# Patient Record
Sex: FEMALE | Race: WHITE | NOT HISPANIC OR LATINO
[De-identification: names, ages, dates, MRNs, and addresses within clinical notes are randomized per-mention and may not be internally consistent; named-entity substitution may affect disease eponyms.]

---

## 2018-08-31 ENCOUNTER — RX RENEWAL (OUTPATIENT)
Age: 43
End: 2018-08-31

## 2018-09-04 ENCOUNTER — RX CHANGE (OUTPATIENT)
Age: 43
End: 2018-09-04

## 2018-09-04 RX ORDER — VALSARTAN AND HYDROCHLOROTHIAZIDE 160; 25 MG/1; MG/1
160-25 TABLET, FILM COATED ORAL DAILY
Qty: 90 | Refills: 3 | Status: DISCONTINUED | COMMUNITY
Start: 2018-08-31 | End: 2018-09-04

## 2018-09-21 ENCOUNTER — RX RENEWAL (OUTPATIENT)
Age: 43
End: 2018-09-21

## 2018-10-29 ENCOUNTER — RX RENEWAL (OUTPATIENT)
Age: 43
End: 2018-10-29

## 2018-12-03 ENCOUNTER — RECORD ABSTRACTING (OUTPATIENT)
Age: 43
End: 2018-12-03

## 2018-12-03 DIAGNOSIS — Z87.891 PERSONAL HISTORY OF NICOTINE DEPENDENCE: ICD-10-CM

## 2018-12-03 DIAGNOSIS — Z83.3 FAMILY HISTORY OF DIABETES MELLITUS: ICD-10-CM

## 2018-12-05 ENCOUNTER — APPOINTMENT (OUTPATIENT)
Dept: ENDOCRINOLOGY | Facility: CLINIC | Age: 43
End: 2018-12-05

## 2019-03-29 ENCOUNTER — MEDICATION RENEWAL (OUTPATIENT)
Age: 44
End: 2019-03-29

## 2019-04-03 ENCOUNTER — MEDICATION RENEWAL (OUTPATIENT)
Age: 44
End: 2019-04-03

## 2019-04-16 ENCOUNTER — RX RENEWAL (OUTPATIENT)
Age: 44
End: 2019-04-16

## 2019-05-17 ENCOUNTER — APPOINTMENT (OUTPATIENT)
Dept: ENDOCRINOLOGY | Facility: CLINIC | Age: 44
End: 2019-05-17
Payer: COMMERCIAL

## 2019-05-17 ENCOUNTER — RESULT CHARGE (OUTPATIENT)
Age: 44
End: 2019-05-17

## 2019-05-17 VITALS
HEART RATE: 106 BPM | WEIGHT: 157 LBS | OXYGEN SATURATION: 98 % | HEIGHT: 64 IN | BODY MASS INDEX: 26.8 KG/M2 | DIASTOLIC BLOOD PRESSURE: 70 MMHG | SYSTOLIC BLOOD PRESSURE: 110 MMHG

## 2019-05-17 LAB
GLUCOSE BLDC GLUCOMTR-MCNC: 131
HBA1C MFR BLD HPLC: 10.6
LDLC SERPL CALC-MCNC: 89
MICROALBUMIN/CREAT 24H UR-RTO: 6

## 2019-05-17 PROCEDURE — 99214 OFFICE O/P EST MOD 30 MIN: CPT | Mod: 25

## 2019-05-17 PROCEDURE — 82962 GLUCOSE BLOOD TEST: CPT

## 2019-05-17 RX ORDER — INSULIN GLARGINE 100 [IU]/ML
100 INJECTION, SOLUTION SUBCUTANEOUS
Refills: 0 | Status: DISCONTINUED | COMMUNITY
End: 2019-05-17

## 2019-05-17 RX ORDER — HYDROCHLOROTHIAZIDE 25 MG/1
25 TABLET ORAL
Refills: 0 | Status: DISCONTINUED | COMMUNITY
End: 2019-05-17

## 2019-05-17 RX ORDER — VALSARTAN AND HYDROCHLOROTHIAZIDE 160; 25 MG/1; MG/1
160-25 TABLET, FILM COATED ORAL DAILY
Qty: 90 | Refills: 1 | Status: DISCONTINUED | COMMUNITY
End: 2019-05-17

## 2019-05-17 RX ORDER — LOSARTAN POTASSIUM 50 MG/1
50 TABLET, FILM COATED ORAL
Refills: 0 | Status: DISCONTINUED | COMMUNITY
End: 2019-05-17

## 2019-05-17 NOTE — REVIEW OF SYSTEMS
[Recent Weight Gain (___ Lbs)] : no recent weight gain [Recent Weight Loss (___ Lbs)] : no recent weight loss [Dysphagia] : no dysphagia [Dysphonia] : no dysphonia [Neck Pain] : no neck pain [Chest Pain] : no chest pain [Palpitations] : no palpitations [Shortness Of Breath] : no shortness of breath [Nausea] : no nausea [Vomiting] : no vomiting was observed [Constipation] : no constipation [Diarrhea] : no diarrhea [Polyuria] : no polyuria [Abdominal Pain] : no abdominal pain [Hair Loss] : no hair loss [Dry Skin] : no dry skin [Tremors] : no tremors [Depression] : no depression [Pain/Numbness of Digits] : no pain/numbness of digits [Anxiety] : no anxiety [Polydipsia] : no polydipsia [Cold Intolerance] : cold tolerant [Heat Intolerance] : heat tolerant

## 2019-05-17 NOTE — ASSESSMENT
[FreeTextEntry1] : 44 year old female with uncontrolled T1DM, hypothyroidism, hypertension, hyperlipidemia, and vitamin D deficiency. Glycemic control is poor with A1c 10.6%.\par \par 1. T1DM- uncontrolled.\par -Increase Tresiba to 45 units daily. If BG still high after several days, ok to increase to 47 units daily.\par -Continue ICR 1:10 at breakfast and 1:15 at dinner. Tighten ICR at lunch to 1:12 to help with afternoon hyperglycemia.\par -Would benefit from CGM given glucose variability. Glucose sensor necessity: This patient with diabetes performs four or more glucose checks per day utilizing a home blood glucose monitor. The patient is treated with insulin via three or more injections daily. This patient requires frequent adjustments to their insulin treatment on the basis of therapeutic continuous glucose monitoring results. In addition, the patient has been to our office for an evaluation of their diabetes control within the past six months. \par -CMN for Dexcom completed.\par -Discussed restarting CSII. Patient will consider. Given information for T-slim.\par -Repeat A1c in 3 months.\par \par 2. Hypothyroid- euthyroid on replacement T4.\par -Continue current dose of LT4.\par -Repat TSH in 3 months.\par \par 3. Hyperlipidemia- LDL-C acceptable.\par -Continue statin.\par -Repeat lipids in 3 months.\par \par 4. Hypertension- controlled\par -Continue ARB.\par \par 5. Vitamin D deficiency- resolved.\par \par 6. Hypercalcemia- stable.\par -? d/t HCTZ?\par -Has been around 10.3. \par -Followed by oncologist. Will continue to monitor.

## 2019-05-17 NOTE — PHYSICAL EXAM
[Alert] : alert [No Acute Distress] : no acute distress [Well Nourished] : well nourished [Well Developed] : well developed [Normal Sclera/Conjunctiva] : normal sclera/conjunctiva [EOMI] : extra ocular movement intact [No Proptosis] : no proptosis [Normal Oropharynx] : the oropharynx was normal [No Respiratory Distress] : no respiratory distress [Thyroid Not Enlarged] : the thyroid was not enlarged [No Thyroid Nodules] : there were no palpable thyroid nodules [No Accessory Muscle Use] : no accessory muscle use [Clear to Auscultation] : lungs were clear to auscultation bilaterally [Normal Rate] : heart rate was normal  [Normal S1, S2] : normal S1 and S2 [Regular Rhythm] : with a regular rhythm [No Edema] : there was no peripheral edema [Normal Strength/Tone] : muscle strength and tone were normal [Normal Gait] : normal gait [No Tremors] : no tremors [Normal Affect] : the affect was normal [Oriented x3] : oriented to person, place, and time [Normal Mood] : the mood was normal [Acanthosis Nigricans] : no acanthosis nigricans [de-identified] : multiple tattoos

## 2019-05-17 NOTE — HISTORY OF PRESENT ILLNESS
[FreeTextEntry1] : Type: 1\par Severity: moderate\par Duration: diagnosed at age 24\par Onset: GYN found polyuria\par Associated symptoms: hyperglycemia\par \par Current meds for glycemic control:\par Tresiba 43 units daily- self-increased from 41 units daily just a few days ago.\par Novolog 1:10 at breakfast, 1:15 at lunch and dinner. ISF 1:40, target 120\par SMBG 5-6x daily. Reports BG is usually 130s to 150s, but is very high between 1pm and 6pm, up to 350s. Rare hypoglycemia, usually only after working out. Reviewed meter. BG ranges 126 to 454, with most readings in the mid-100s to mid-200s range.\par Was on CSII in the past- off right now because "I just needed a break."\par Current B, has not eaten yet today\par \par Last eye exam: over one year, no changes in vision\par Last foot exam: in office, denies pain/numbness/tingling in B/L feet\par Diet: carb counting\par Weight: stable\par Exercise: weight training 3-4x per week\par \par Also with hypothyroidism, on LT4 50 mcg daily. Takes on an empty stomach at least one hour before eating with other medications.

## 2019-07-01 ENCOUNTER — MEDICATION RENEWAL (OUTPATIENT)
Age: 44
End: 2019-07-01

## 2019-08-16 ENCOUNTER — APPOINTMENT (OUTPATIENT)
Dept: ENDOCRINOLOGY | Facility: CLINIC | Age: 44
End: 2019-08-16
Payer: COMMERCIAL

## 2019-08-16 PROCEDURE — 36415 COLL VENOUS BLD VENIPUNCTURE: CPT

## 2019-08-27 LAB
25(OH)D3 SERPL-MCNC: 38.5 NG/ML
ALBUMIN SERPL ELPH-MCNC: 4.6 G/DL
ALP BLD-CCNC: 69 U/L
ALT SERPL-CCNC: 28 U/L
ANION GAP SERPL CALC-SCNC: 11 MMOL/L
AST SERPL-CCNC: 23 U/L
BASOPHILS # BLD AUTO: 0.07 K/UL
BASOPHILS NFR BLD AUTO: 0.8 %
BILIRUB SERPL-MCNC: 0.3 MG/DL
BUN SERPL-MCNC: 16 MG/DL
CALCIUM SERPL-MCNC: 10 MG/DL
CHLORIDE SERPL-SCNC: 99 MMOL/L
CHOLEST SERPL-MCNC: 164 MG/DL
CHOLEST/HDLC SERPL: 2.7 RATIO
CO2 SERPL-SCNC: 31 MMOL/L
CREAT SERPL-MCNC: 0.94 MG/DL
CREAT SPEC-SCNC: 136 MG/DL
EOSINOPHIL # BLD AUTO: 0.13 K/UL
EOSINOPHIL NFR BLD AUTO: 1.6 %
ESTIMATED AVERAGE GLUCOSE: 192 MG/DL
GLUCOSE SERPL-MCNC: 170 MG/DL
HBA1C MFR BLD HPLC: 8.3 %
HCT VFR BLD CALC: 45.5 %
HDLC SERPL-MCNC: 61 MG/DL
HGB BLD-MCNC: 14.3 G/DL
IMM GRANULOCYTES NFR BLD AUTO: 0.1 %
LDLC SERPL CALC-MCNC: 89 MG/DL
LYMPHOCYTES # BLD AUTO: 2.44 K/UL
LYMPHOCYTES NFR BLD AUTO: 29.6 %
MAN DIFF?: NORMAL
MCHC RBC-ENTMCNC: 29.1 PG
MCHC RBC-ENTMCNC: 31.4 GM/DL
MCV RBC AUTO: 92.5 FL
MICROALBUMIN 24H UR DL<=1MG/L-MCNC: <1.2 MG/DL
MICROALBUMIN/CREAT 24H UR-RTO: NORMAL MG/G
MONOCYTES # BLD AUTO: 0.62 K/UL
MONOCYTES NFR BLD AUTO: 7.5 %
NEUTROPHILS # BLD AUTO: 4.98 K/UL
NEUTROPHILS NFR BLD AUTO: 60.4 %
PLATELET # BLD AUTO: 364 K/UL
POTASSIUM SERPL-SCNC: 4.9 MMOL/L
PROT SERPL-MCNC: 7.1 G/DL
RBC # BLD: 4.92 M/UL
RBC # FLD: 13.3 %
SODIUM SERPL-SCNC: 141 MMOL/L
TRIGL SERPL-MCNC: 68 MG/DL
TSH SERPL-ACNC: 1.6 UIU/ML
WBC # FLD AUTO: 8.25 K/UL

## 2019-08-30 ENCOUNTER — APPOINTMENT (OUTPATIENT)
Dept: ENDOCRINOLOGY | Facility: CLINIC | Age: 44
End: 2019-08-30
Payer: COMMERCIAL

## 2019-08-30 VITALS
HEART RATE: 9 BPM | HEIGHT: 64 IN | SYSTOLIC BLOOD PRESSURE: 140 MMHG | BODY MASS INDEX: 27.31 KG/M2 | DIASTOLIC BLOOD PRESSURE: 80 MMHG | WEIGHT: 160 LBS

## 2019-08-30 PROCEDURE — 99214 OFFICE O/P EST MOD 30 MIN: CPT | Mod: 25

## 2019-08-30 PROCEDURE — 82962 GLUCOSE BLOOD TEST: CPT | Mod: NC

## 2019-08-30 PROCEDURE — 95251 CONT GLUC MNTR ANALYSIS I&R: CPT

## 2019-08-30 NOTE — REVIEW OF SYSTEMS
[Recent Weight Gain (___ Lbs)] : no recent weight gain [Recent Weight Loss (___ Lbs)] : no recent weight loss [Dysphagia] : no dysphagia [Blurry Vision] : no blurred vision [Neck Pain] : no neck pain [Dysphonia] : no dysphonia [Chest Pain] : no chest pain [Palpitations] : no palpitations [Lower Ext Edema] : no lower extremity edema [Shortness Of Breath] : no shortness of breath [Vomiting] : no vomiting was observed [Nausea] : no nausea [Diarrhea] : no diarrhea [Constipation] : no constipation [Abdominal Pain] : no abdominal pain [Polyuria] : no polyuria [Pain/Numbness of Digits] : no pain/numbness of digits [Tremors] : no tremors [Depression] : no depression [Anxiety] : no anxiety [Polydipsia] : no polydipsia [Cold Intolerance] : cold tolerant [Heat Intolerance] : heat tolerant [Swelling] : no swelling

## 2019-08-30 NOTE — PHYSICAL EXAM
[Alert] : alert [No Acute Distress] : no acute distress [Well Nourished] : well nourished [Well Developed] : well developed [Normal Sclera/Conjunctiva] : normal sclera/conjunctiva [EOMI] : extra ocular movement intact [No Proptosis] : no proptosis [Normal Oropharynx] : the oropharynx was normal [Thyroid Not Enlarged] : the thyroid was not enlarged [No Thyroid Nodules] : there were no palpable thyroid nodules [No Respiratory Distress] : no respiratory distress [No Accessory Muscle Use] : no accessory muscle use [Clear to Auscultation] : lungs were clear to auscultation bilaterally [Normal Rate] : heart rate was normal  [Regular Rhythm] : with a regular rhythm [Normal S1, S2] : normal S1 and S2 [No Edema] : there was no peripheral edema [Normal Gait] : normal gait [Normal Strength/Tone] : muscle strength and tone were normal [No Tremors] : no tremors [Oriented x3] : oriented to person, place, and time [Normal Affect] : the affect was normal [Normal Mood] : the mood was normal [Acanthosis Nigricans] : no acanthosis nigricans [de-identified] : multiple tattoos

## 2019-08-30 NOTE — HISTORY OF PRESENT ILLNESS
[FreeTextEntry1] : History of breast cancer, planning on reconstructive surgery in 2019.\par \par Type: 1\par Severity: moderate\par Duration: diagnosed at age 24\par Onset: GYN found polyuria\par Associated symptoms: hyperglycemia\par \par Current meds for glycemic control:\par Tresiba 45 units daily\par Novolog 1:10 at breakfast, 1:12 at lunch, 1:15 dinner. ISF 1:40, target 120\par SMBG with Decom. Changes sensor every 10 days. Reviewed CGM download. Avg  +/- 69 with 64% of values in range, 12% high, and 4% low. Occasional hypoglycemia at night. BG starts to rise daily at 11am. Patient reports breakfast is at 930 every day, and BG rises at 11am even when she skips breakfast.\par Current B, given apple juice. Asymptomatic.BG improved to 70s.\par \par Last eye exam: over one year, no changes in vision\par Last foot exam: in office, denies pain/numbness/tingling in B/L feet\par Diet: carb counting\par Weight: stable\par Exercise: weight training 3-4x per week\par \par Also with hypothyroidism, on LT4 50 mcg daily. Takes on an empty stomach at least one hour before eating with other medications.

## 2019-08-30 NOTE — ASSESSMENT
[FreeTextEntry1] : 44 year old female with  T1DM, hypothyroidism, hypertension, hyperlipidemia, and vitamin D deficiency. Glycemic control is improving, A1c now down to 8.3% from 10.6% 3 months ago.\par \par 1. T1DM- improving.\par -Continue Tresiba to 45 units daily. Try taking at night instead of in the morning to see if this helps with 11am rise in BG.\par -Complete basal checks. 11am rise in BG may be due to slow digestion?? Can consider taking breakfast insulin after the meal if this is the case.\par -May also consider small insulin bolus around 11am to prevent BG rise. InPen would be a good choice to help deliver smaller doses of insulin.\par -Continue ICR 1:10 at breakfast 1:12 at lunch, and 1:15 at dinner.\par -Continue Dexcom.\par -Repeat A1c in 3 months.\par -Reviewed BG targets and risk of hyperglycemia in the periopeartive period.\par \par 2. Hypothyroid- euthyroid on replacement T4.\par -Continue current dose of LT4.\par -Repat TSH in 3 months.\par \par 3. Hyperlipidemia- LDL-C acceptable.\par -Continue statin.\par -Repeat lipids in 3 months.\par \par 4. Hypertension- controlled\par -Continue ARB.\par \par 5. Hypercalcemia- now normal at 10.0\par -Followed by oncologist. Will continue to monitor.

## 2019-09-05 ENCOUNTER — MEDICATION RENEWAL (OUTPATIENT)
Age: 44
End: 2019-09-05

## 2019-09-17 ENCOUNTER — RESULT CHARGE (OUTPATIENT)
Age: 44
End: 2019-09-17

## 2019-09-30 ENCOUNTER — LABORATORY RESULT (OUTPATIENT)
Age: 44
End: 2019-09-30

## 2019-09-30 ENCOUNTER — APPOINTMENT (OUTPATIENT)
Dept: ENDOCRINOLOGY | Facility: CLINIC | Age: 44
End: 2019-09-30
Payer: COMMERCIAL

## 2019-09-30 PROCEDURE — 36415 COLL VENOUS BLD VENIPUNCTURE: CPT

## 2019-10-01 ENCOUNTER — OTHER (OUTPATIENT)
Age: 44
End: 2019-10-01

## 2019-11-20 ENCOUNTER — RX RENEWAL (OUTPATIENT)
Age: 44
End: 2019-11-20

## 2019-11-21 ENCOUNTER — RX RENEWAL (OUTPATIENT)
Age: 44
End: 2019-11-21

## 2019-11-22 ENCOUNTER — RX RENEWAL (OUTPATIENT)
Age: 44
End: 2019-11-22

## 2019-11-25 ENCOUNTER — APPOINTMENT (OUTPATIENT)
Dept: ENDOCRINOLOGY | Facility: CLINIC | Age: 44
End: 2019-11-25
Payer: COMMERCIAL

## 2019-11-25 VITALS
HEART RATE: 105 BPM | HEIGHT: 64 IN | WEIGHT: 150 LBS | DIASTOLIC BLOOD PRESSURE: 70 MMHG | BODY MASS INDEX: 25.61 KG/M2 | SYSTOLIC BLOOD PRESSURE: 128 MMHG

## 2019-11-25 LAB — GLUCOSE BLDC GLUCOMTR-MCNC: 85

## 2019-11-25 PROCEDURE — 99214 OFFICE O/P EST MOD 30 MIN: CPT | Mod: 25

## 2019-11-25 PROCEDURE — 95251 CONT GLUC MNTR ANALYSIS I&R: CPT

## 2019-11-25 PROCEDURE — 82962 GLUCOSE BLOOD TEST: CPT | Mod: NC

## 2019-11-25 NOTE — ASSESSMENT
[FreeTextEntry1] : 44 year old female with  T1DM, hypothyroidism, hypertension, hyperlipidemia, and vitamin D deficiency. Glycemic control is improving, A1c  7.6 on dexcom report\par  .\par \par 1. T1DM- improving.\par -Continue Tresiba to 32units  qhs \par -rotate injeciton  sites\par -? gastroparesiss- take lunhctime insulin injection 30 min afrter start of meal - may help with sppikes \par -\par -Continue ICR 1:10 at breakfast 1:12 at lunch, and 1:15 at dinner.\par -Continue Dexcom.\par -Repeat A1c in 3 months.\par \par \par 2. Hypothyroid- euthyroid on replacement T4.\par -Continue current dose of LT4.\par -Repat TSH in 3 months.\par \par 3. Hyperlipidemia- LDL-C acceptable.\par -Continue statin.\par -Repeat lipids in 3 months.\par \par 4. Hypertension- controlled\par -Continue ARB.\par \par 5. Hypercalcemia- now normal at 10.0\par -Followed by oncologist. Will continue to monitor.\par \par 6> GII - ? gastroparesis- will  see gastroenterology for eval  \par  may need to have low residue diet

## 2019-11-25 NOTE — PHYSICAL EXAM
[Alert] : alert [Well Nourished] : well nourished [No Acute Distress] : no acute distress [Well Developed] : well developed [Normal Sclera/Conjunctiva] : normal sclera/conjunctiva [EOMI] : extra ocular movement intact [Thyroid Not Enlarged] : the thyroid was not enlarged [No Thyroid Nodules] : there were no palpable thyroid nodules [No Respiratory Distress] : no respiratory distress [No Accessory Muscle Use] : no accessory muscle use [Clear to Auscultation] : lungs were clear to auscultation bilaterally [Normal Rate] : heart rate was normal  [Normal S1, S2] : normal S1 and S2 [Regular Rhythm] : with a regular rhythm [No Edema] : there was no peripheral edema [Normal Gait] : normal gait [Normal Strength/Tone] : muscle strength and tone were normal [No Tremors] : no tremors [Oriented x3] : oriented to person, place, and time [Normal Affect] : the affect was normal [Normal Mood] : the mood was normal [No Proptosis] : no proptosis [Pedal Pulses Normal] : the pedal pulses are present [Anterior Cervical Nodes] : anterior cervical nodes [Post Cervical Nodes] : posterior cervical nodes [Axillary Nodes] : axillary nodes [No Spinal Tenderness] : no spinal tenderness [Spine Straight] : spine straight [No Stigmata of Cushings Syndrome] : no stigmata of cushings syndrome [No Rash] : no rash [Acanthosis Nigricans] : no acanthosis nigricans [Left Foot Was Examined] : left foot ~C was examined [Right Foot Was Examined] : right foot ~C was examined [Full ROM] : with full range of motion [Normal] : normal [Normal Reflexes] : deep tendon reflexes were 2+ and symmetric [de-identified] : multiple tattoos   area site hypertrophy on right arm

## 2019-11-25 NOTE — HISTORY OF PRESENT ILLNESS
[FreeTextEntry1] : History of breast cancer, planning on reconstructive surgery in 2019.\par \par Type: 1\par Severity: moderate\par Duration: diagnosed at age 24\par Onset: GYN found polyuria\par Associated symptoms: hyperglycemia\par just had surgery 5 weeks ago for abdominopalsty \par Current meds for glycemic control:\par Tresiba 32 units daily weaned down from 45 \par Novolog 1:10 at breakfast, 1:12 at lunch, 1:15 dinner. ISF 1:40, target 120\par SMBG with Decom. Changes sensor every 10 days. Reviewed CGM download. Avg +/- 62 with 58% of values in range, 39 high, and 3 % low. \par \par  BS rise typiclly in afternoon \par  very stressed with work \par  \par Current B  but dexcom read 55 given apple juice. Asymptomatic.BG improved to 107 by FS repeat \par did not take any novolog this AM \par  waiting to get to work to have breakfast \par \par Last eye exam: over one year, no changes in vision  due for check up \par Last foot exam: in office, denies pain/numbness/tingling in B/L feet\par Diet: carb counting\par Weight: stable\par Exercise: weight training 3-4x per week\par \par Also with hypothyroidism, on LT4 50 mcg daily. Takes on an empty stomach at least one hour before eating with other medications.

## 2019-11-25 NOTE — REVIEW OF SYSTEMS
[Recent Weight Gain (___ Lbs)] : no recent weight gain [Recent Weight Loss (___ Lbs)] : no recent weight loss [Blurry Vision] : no blurred vision [Dysphagia] : no dysphagia [Neck Pain] : no neck pain [Dysphonia] : no dysphonia [Chest Pain] : no chest pain [Palpitations] : no palpitations [Lower Ext Edema] : no lower extremity edema [Shortness Of Breath] : no shortness of breath [Nausea] : no nausea [Vomiting] : no vomiting was observed [Constipation] : no constipation [Polyuria] : no polyuria [Abdominal Pain] : no abdominal pain [Diarrhea] : no diarrhea [Tremors] : no tremors [Pain/Numbness of Digits] : no pain/numbness of digits [Depression] : no depression [Anxiety] : no anxiety [Polydipsia] : no polydipsia [Cold Intolerance] : cold tolerant [Heat Intolerance] : heat tolerant [Swelling] : no swelling [Negative] : Respiratory [FreeTextEntry7] : some early satiety  eats really 1 meal per day  appetite is not too much   lunch is main meal then has  smaller dinner

## 2020-02-03 ENCOUNTER — RX RENEWAL (OUTPATIENT)
Age: 45
End: 2020-02-03

## 2020-02-04 ENCOUNTER — RX RENEWAL (OUTPATIENT)
Age: 45
End: 2020-02-04

## 2020-03-06 ENCOUNTER — APPOINTMENT (OUTPATIENT)
Dept: ENDOCRINOLOGY | Facility: CLINIC | Age: 45
End: 2020-03-06

## 2020-06-12 ENCOUNTER — APPOINTMENT (OUTPATIENT)
Dept: ENDOCRINOLOGY | Facility: CLINIC | Age: 45
End: 2020-06-12

## 2020-06-19 ENCOUNTER — APPOINTMENT (OUTPATIENT)
Dept: ENDOCRINOLOGY | Facility: CLINIC | Age: 45
End: 2020-06-19

## 2020-07-21 ENCOUNTER — APPOINTMENT (OUTPATIENT)
Dept: ENDOCRINOLOGY | Facility: CLINIC | Age: 45
End: 2020-07-21
Payer: COMMERCIAL

## 2020-07-21 VITALS
HEIGHT: 64 IN | HEART RATE: 98 BPM | WEIGHT: 155 LBS | DIASTOLIC BLOOD PRESSURE: 76 MMHG | BODY MASS INDEX: 26.46 KG/M2 | SYSTOLIC BLOOD PRESSURE: 112 MMHG

## 2020-07-21 DIAGNOSIS — Z87.898 PERSONAL HISTORY OF OTHER SPECIFIED CONDITIONS: ICD-10-CM

## 2020-07-21 PROCEDURE — 95251 CONT GLUC MNTR ANALYSIS I&R: CPT

## 2020-07-21 PROCEDURE — 99214 OFFICE O/P EST MOD 30 MIN: CPT | Mod: 25

## 2020-07-21 RX ORDER — GABAPENTIN 600 MG/1
600 TABLET, COATED ORAL 3 TIMES DAILY
Qty: 30 | Refills: 0 | Status: DISCONTINUED | COMMUNITY
Start: 2019-11-25 | End: 2020-07-21

## 2020-07-21 NOTE — PHYSICAL EXAM
[Alert] : alert [Well Nourished] : well nourished [No Acute Distress] : no acute distress [Well Developed] : well developed [Normal Sclera/Conjunctiva] : normal sclera/conjunctiva [No Proptosis] : no proptosis [Normal Oropharynx] : the oropharynx was normal [EOMI] : extra ocular movement intact [No Respiratory Distress] : no respiratory distress [No Thyroid Nodules] : no palpable thyroid nodules [No Accessory Muscle Use] : no accessory muscle use [Thyroid Not Enlarged] : the thyroid was not enlarged [Clear to Auscultation] : lungs were clear to auscultation bilaterally [Normal Rate] : heart rate was normal [Normal S1, S2] : normal S1 and S2 [No Edema] : no peripheral edema [Pedal Pulses Normal] : the pedal pulses are present [Regular Rhythm] : with a regular rhythm [Normal Bowel Sounds] : normal bowel sounds [Not Tender] : non-tender [Not Distended] : not distended [Soft] : abdomen soft [Normal Anterior Cervical Nodes] : no anterior cervical lymphadenopathy [Normal Posterior Cervical Nodes] : no posterior cervical lymphadenopathy [Spine Straight] : spine straight [No Stigmata of Cushings Syndrome] : no stigmata of Cushings Syndrome [Normal Gait] : normal gait [No Rash] : no rash [No Tremors] : no tremors [Oriented x3] : oriented to person, place, and time [Acanthosis Nigricans] : no acanthosis nigricans

## 2020-07-21 NOTE — HISTORY OF PRESENT ILLNESS
[FreeTextEntry1] : followup for dm \par breast cancer s/p surgery and chemoT now s/p reconstructions

## 2020-07-21 NOTE — ASSESSMENT
[Retinopathy Screening] : Patient was referred to ophthalmology for retinopathy screening [Importance of Diet and Exercise] : importance of diet and exercise to improve glycemic control, achieve weight loss and improve cardiovascular health [Exercise/Effect on Glucose] : exercise/effect on glucose [FreeTextEntry1] : 44 year old female with T1DM, hypothyroidism, hypertension, hyperlipidemia,\par  and vitamin D deficiency.  LAbs pending. \par \par 1. T1DM\par - cgms reviewed and discussed . She has consistent hyperG over night 250-350 due to night snacks. She sattes she boluses them w 5 units but i suspect she either doesn’t or just  takes random doses of insulin. She is not very accurate with her carb counting. She has hyperG > 250 after each dinner. She changes INC ration for dinner. \par - CDE for carb counting bc this is  her main problems. Advised to have more consistent meal pattern. \par - Continue Tresiba to 33 u HS . \par - continue novolog for meals. continue ICR 1: 5 in am , 1:12 at lunch and dinner 1:12 /1:15 .\par -Continue Dexcom.\par - discussed diet and exercise. Continue low carb meals. \par - encouraged more exercise walking 30 min 3 x week\par - Discussed complications of diabetes at length including MI/CVA/ESRD/dialysis/blindness/amputations/infections\par - Needs to try to have more protein for meals\par Importance of blood glucose monitoring discussed. Targets reviewed. Recommended pt try to keep blood glucose level below 130 (110) before meals and below 160 (140) two hours after meals.\par - she will fax us her labs. \par - check darwin/ ratio. continue ARB / HCTZ \par - check GFR \par - eye exam referral \par - she can continue with weight lifting at home 4 days/ week \par \par 2. Hypothyroid- check tfts \par -Continue current dose of LT4.\par -Repeat TSH in 3 months.\par \par 3. Hyperlipidemia- check lipids. \par -Continue statin.\par -Repeat lipids in 3 months.\par \par 4. Hypertension-  bp at target on meds.\par I advised low fat/low cholesterol diet, low salt diet, and weight loss\par -Continue ARB.\par \par 5. Hypercalcemia- now normal at 10.0\par -Followed by oncologist. Will continue to monitor. \par \par 6 . vitamin d deficiency : continue supplements. MVI \par discussed importance for bone health \par

## 2020-07-24 ENCOUNTER — RX RENEWAL (OUTPATIENT)
Age: 45
End: 2020-07-24

## 2021-02-23 ENCOUNTER — APPOINTMENT (OUTPATIENT)
Dept: ENDOCRINOLOGY | Facility: CLINIC | Age: 46
End: 2021-02-23
Payer: COMMERCIAL

## 2021-02-23 VITALS
TEMPERATURE: 97.5 F | SYSTOLIC BLOOD PRESSURE: 114 MMHG | BODY MASS INDEX: 26.69 KG/M2 | OXYGEN SATURATION: 98 % | HEIGHT: 64 IN | HEART RATE: 90 BPM | WEIGHT: 156.31 LBS | DIASTOLIC BLOOD PRESSURE: 60 MMHG | RESPIRATION RATE: 14 BRPM

## 2021-02-23 PROCEDURE — 99214 OFFICE O/P EST MOD 30 MIN: CPT | Mod: 25

## 2021-02-23 PROCEDURE — 95251 CONT GLUC MNTR ANALYSIS I&R: CPT

## 2021-02-23 PROCEDURE — 99072 ADDL SUPL MATRL&STAF TM PHE: CPT

## 2021-02-23 NOTE — ASSESSMENT
[FreeTextEntry1] : 44 year old female with T1DM, hypothyroidism, hypertension, hyperlipidemia,\par  and vitamin D deficiency.  She just had cellulitis of breast implant and was admitted to [facility]. \par \par 1. T1DM: moderately uncontrolled with a1c 9.2. \par - cgms reviewed and discussed .She has sustained hyperG after meals. Possible gastroparesis. She has GI appt soon for evaluation. Advsied to take meal bolus 30 min after start  of meal. \par She is not very accurate with her carb counting. \par -  Advised to have more consistent meal pattern. \par - Continue Tresiba to 33 u HS . \par - continue novolog for meals. She does not have any bkfast .\par - decrease ICR 1: 10 for lunch and dinner snack 8 units (she takes random doses of insulin )\par - continue Dexcom.\par - discussed diet and exercise. \par - encouraged more exercise walking 30 min 3 x week\par - Importance of blood glucose monitoring discussed. Targets reviewed. Recommended pt try to keep blood glucose level below 130 (110) before meals and below 160 (140) two hours after meals.\par - darwin/ c ratio normal. \par - GFR normal\par - eye exam referral again \par - foot exam normal today: normal monofilament and vibratory B/L\par - she can continue with weight lifting at home 4 days/ week \par \par 2. Hypothyroid\par -Continue current dose of LT4.\par -Repeat TSH in 3 months.\par \par 3. Hyperlipidemia- LDL at target.\par low fat/low cholesterol diet and weight loss advised\par -Continue statin.\par -Repeat lipids in 3 months.\par \par 4. Hypertension-  bp at target on meds.\par I advised low fat/low cholesterol diet, low salt diet, and weight loss\par -Continue ARB.\par \par 5. Hypercalcemia- now normal \par -Followed by oncologist (breast cancer) . Will continue to monitor. \par \par 6 . vitamin d deficiency : continue MVI \par

## 2021-02-23 NOTE — PHYSICAL EXAM
[Alert] : alert [Well Nourished] : well nourished [No Acute Distress] : no acute distress [Well Developed] : well developed [Normal Sclera/Conjunctiva] : normal sclera/conjunctiva [EOMI] : extra ocular movement intact [No Proptosis] : no proptosis [Normal Oropharynx] : the oropharynx was normal [Thyroid Not Enlarged] : the thyroid was not enlarged [No Thyroid Nodules] : no palpable thyroid nodules [No Respiratory Distress] : no respiratory distress [No Accessory Muscle Use] : no accessory muscle use [Clear to Auscultation] : lungs were clear to auscultation bilaterally [Normal S1, S2] : normal S1 and S2 [Normal Rate] : heart rate was normal [Regular Rhythm] : with a regular rhythm [No Edema] : no peripheral edema [Pedal Pulses Normal] : the pedal pulses are present [Normal Bowel Sounds] : normal bowel sounds [Not Tender] : non-tender [Not Distended] : not distended [Soft] : abdomen soft [Normal Anterior Cervical Nodes] : no anterior cervical lymphadenopathy [No Tremors] : no tremors [Oriented x3] : oriented to person, place, and time [Acanthosis Nigricans] : no acanthosis nigricans

## 2021-03-09 ENCOUNTER — APPOINTMENT (OUTPATIENT)
Dept: ENDOCRINOLOGY | Facility: CLINIC | Age: 46
End: 2021-03-09

## 2021-03-23 ENCOUNTER — APPOINTMENT (OUTPATIENT)
Dept: ENDOCRINOLOGY | Facility: CLINIC | Age: 46
End: 2021-03-23

## 2021-06-01 ENCOUNTER — RX RENEWAL (OUTPATIENT)
Age: 46
End: 2021-06-01

## 2021-07-20 ENCOUNTER — NON-APPOINTMENT (OUTPATIENT)
Age: 46
End: 2021-07-20

## 2021-07-20 ENCOUNTER — APPOINTMENT (OUTPATIENT)
Dept: ENDOCRINOLOGY | Facility: CLINIC | Age: 46
End: 2021-07-20
Payer: COMMERCIAL

## 2021-07-20 ENCOUNTER — TRANSCRIPTION ENCOUNTER (OUTPATIENT)
Age: 46
End: 2021-07-20

## 2021-07-20 VITALS
TEMPERATURE: 98.2 F | BODY MASS INDEX: 26.66 KG/M2 | OXYGEN SATURATION: 98 % | RESPIRATION RATE: 14 BRPM | HEIGHT: 64 IN | WEIGHT: 156.13 LBS | HEART RATE: 98 BPM | DIASTOLIC BLOOD PRESSURE: 60 MMHG | SYSTOLIC BLOOD PRESSURE: 100 MMHG

## 2021-07-20 PROCEDURE — 95251 CONT GLUC MNTR ANALYSIS I&R: CPT

## 2021-07-20 PROCEDURE — 99072 ADDL SUPL MATRL&STAF TM PHE: CPT

## 2021-07-20 PROCEDURE — 99214 OFFICE O/P EST MOD 30 MIN: CPT | Mod: 25

## 2021-08-03 ENCOUNTER — APPOINTMENT (OUTPATIENT)
Dept: ENDOCRINOLOGY | Facility: CLINIC | Age: 46
End: 2021-08-03

## 2021-09-29 NOTE — ADDENDUM
[FreeTextEntry1] : This patient with diabetes performs 4 glucose checks per day using a continuous glucose monitor. The patient is treated with insulin via 4 injections daily. This patient requires frequent adjustments to their insulin treatment on the basis of therapeutic continuous glucose monitoring results via adjusting a fixed dose of Tresiba and Novolog per ICR. In addition, the patient has been to our office for an evaluation of their diabetes control within the past 6 months. \par \par

## 2021-09-29 NOTE — ASSESSMENT
[Carbohydrate Consistent Diet] : carbohydrate consistent diet [Self Monitoring of Blood Glucose] : self monitoring of blood glucose [FreeTextEntry1] : 44 year old female with T1DM, hypothyroidism, hypertension, hyperlipidemia.  She just had cellulitis of breast implant and was admitted to [facility]. She takes steroids topically after cataract surgery. [place] cataract \par \par 1. T1DM:  \par - cgms reviewed and discussed . she has random doses of isnulins for meals. She continues not to count and  match. CDE appt to start matching ICR. She took a break from pump. \par Probably she has gastroparesis.  take meal bolus 30 min after start  of meal. \par She is not very accurate with her carb counting. She needs to start working on matching. \par - Continue Tresiba to 33 u HS . \par - cont  ICR 1: 10 for lunch and dinner snack 8 units  but she does  not count. \par - continue Dexcom.\par - discussed diet and exercise. \par - encouraged more exercise walking 30 min 3 x week\par - check labs \par - eye exam done; no DR\par - continue with weight lifting at home 4 days/ week \par - covid vaccine done \par \par 2. Hypothyroid\par -Continue current dose of LT4.\par \par 3. Hyperlipidemia\par low fat/low cholesterol diet and weight loss advised\par -Continue statin.\par \par 4. Hypertension-  bp at target on meds. continue current management \par I advised low fat/low cholesterol diet, low salt diet, and weight loss\par \par 5. Hypercalcemia- Followed by oncologist (breast cancer) \par \par 6 . vitamin d deficiency : continue MVI \par

## 2021-10-13 ENCOUNTER — RX RENEWAL (OUTPATIENT)
Age: 46
End: 2021-10-13

## 2021-10-19 ENCOUNTER — APPOINTMENT (OUTPATIENT)
Dept: ENDOCRINOLOGY | Facility: CLINIC | Age: 46
End: 2021-10-19

## 2021-12-28 ENCOUNTER — RX RENEWAL (OUTPATIENT)
Age: 46
End: 2021-12-28

## 2022-01-19 ENCOUNTER — APPOINTMENT (OUTPATIENT)
Dept: ENDOCRINOLOGY | Facility: CLINIC | Age: 47
End: 2022-01-19
Payer: COMMERCIAL

## 2022-01-19 VITALS
RESPIRATION RATE: 14 BRPM | WEIGHT: 159.13 LBS | TEMPERATURE: 98.2 F | HEART RATE: 69 BPM | BODY MASS INDEX: 27.17 KG/M2 | SYSTOLIC BLOOD PRESSURE: 118 MMHG | OXYGEN SATURATION: 100 % | HEIGHT: 64 IN | DIASTOLIC BLOOD PRESSURE: 60 MMHG

## 2022-01-19 PROCEDURE — 95251 CONT GLUC MNTR ANALYSIS I&R: CPT

## 2022-01-19 PROCEDURE — 99215 OFFICE O/P EST HI 40 MIN: CPT | Mod: 25

## 2022-01-19 RX ORDER — INSULIN ASPART 100 [IU]/ML
100 INJECTION, SOLUTION INTRAVENOUS; SUBCUTANEOUS
Qty: 3 | Refills: 1 | Status: DISCONTINUED | COMMUNITY
Start: 2019-11-20 | End: 2022-01-19

## 2022-01-19 NOTE — ASSESSMENT
[FreeTextEntry1] : 44 year old female with T1DM, hypothyroidism, hypertension, hyperlipidemia.  \par Bilateral mastectomy for breast ac with breast implants. Snow  flakes cataract. \par \par 1. T1DM:  \par - revisit pump use again. \par - cgms reviewed and discussed . she has hyperG after lunch and dinner consistently. a1c 8.8. \par She mismatch ins to carbs. CDE appt to start matching ICR. She took a break from pump. \par Probably she has gastroparesis.  take meal bolus 30 min after start  of meal. \par She needs to start working on matching. \par - Continue Tresiba to 33 u HS . \par - cont  ICR 1: 10 for lunch and dinner snack 8 units  \par - continue Dexcom.\par - discussed diet and exercise. \par - encouraged more exercise walking 30 min 3 x week\par - all lab results reviewed and discussed with patient. All questions answered\par - eye exam done; no DR\par - continue with weight lifting \par - cont ARB \par \par 2. Hypothyroid\par - pt euthyroid clinically and biochemically.\par -Continue current dose of LT4.\par \par 3. Hyperlipidemia: lipids excellent. Continue statin.\par low fat/low cholesterol diet and weight loss advised\par \par 4. Hypertension-  bp at target on meds. continue current management \par I advised low fat/low cholesterol diet, low salt diet, and weight loss\par \par 5. Hypercalcemia- serum calcium normal. \par Followed by oncologist (breast cancer) \par \par 6 . vitamin d deficiency : continue MVI \par

## 2022-03-24 ENCOUNTER — RX RENEWAL (OUTPATIENT)
Age: 47
End: 2022-03-24

## 2022-04-18 ENCOUNTER — RX RENEWAL (OUTPATIENT)
Age: 47
End: 2022-04-18

## 2022-07-05 ENCOUNTER — RX RENEWAL (OUTPATIENT)
Age: 47
End: 2022-07-05

## 2022-09-14 RX ORDER — INSULIN PMP CART,AUT,G6/7,CNTR
EACH SUBCUTANEOUS
Qty: 1 | Refills: 0 | Status: ACTIVE | COMMUNITY
Start: 2022-09-14 | End: 1900-01-01

## 2022-09-27 ENCOUNTER — RX RENEWAL (OUTPATIENT)
Age: 47
End: 2022-09-27

## 2022-09-29 ENCOUNTER — APPOINTMENT (OUTPATIENT)
Dept: ENDOCRINOLOGY | Facility: CLINIC | Age: 47
End: 2022-09-29

## 2022-09-29 VITALS
DIASTOLIC BLOOD PRESSURE: 70 MMHG | SYSTOLIC BLOOD PRESSURE: 110 MMHG | HEIGHT: 64 IN | OXYGEN SATURATION: 98 % | HEART RATE: 93 BPM | WEIGHT: 154 LBS | BODY MASS INDEX: 26.29 KG/M2

## 2022-09-29 DIAGNOSIS — E78.49 OTHER HYPERLIPIDEMIA: ICD-10-CM

## 2022-09-29 DIAGNOSIS — E10.65 TYPE 1 DIABETES MELLITUS WITH HYPERGLYCEMIA: ICD-10-CM

## 2022-09-29 LAB
GLUCOSE BLDC GLUCOMTR-MCNC: 164
HBA1C MFR BLD HPLC: 8.8
LDLC SERPL DIRECT ASSAY-MCNC: 82

## 2022-09-29 PROCEDURE — 95251 CONT GLUC MNTR ANALYSIS I&R: CPT

## 2022-09-29 PROCEDURE — 99214 OFFICE O/P EST MOD 30 MIN: CPT | Mod: 25

## 2022-09-29 PROCEDURE — 82962 GLUCOSE BLOOD TEST: CPT | Mod: NC

## 2022-09-29 RX ORDER — INSULIN LISPRO 100 [IU]/ML
100 INJECTION, SOLUTION INTRAVENOUS; SUBCUTANEOUS
Qty: 9 | Refills: 1 | Status: ACTIVE | COMMUNITY
Start: 2022-09-29 | End: 1900-01-01

## 2022-09-29 NOTE — HISTORY OF PRESENT ILLNESS
[FreeTextEntry1] : Breast cancer s/p surgery and chemoT now s/p reconstructions \par Currently on maintenance antibiotics for an infection in breast reconstruction (undetermined amount of time she will be on but has been on since March  )  \par \par **PT HAS HER OMNIPOD SUPPLIES WITH HER TODAY, AWARE WE CANNOT TEACH TODAY BUT HOPING TO BE TAUGHT SOON**\par \par Type: 1\par Severity: moderate\par Duration: diagnosed at age 24\par Onset: GYN found polyuria\par Associated symptoms: hyperglycemia\par \par Current meds for glycemic control:\par Tresiba 30-33 units daily (work days and non work days) \par Humalog approx a 1:5-1:8 carb ratio , sometimes give correction dose - approx 30 units a day\par FS in office 164 - had morning coffee\par \par Dexcom- Average 204\par Standard deviation 72\par GMI 8.2% \par \par 25% Very high \par 27% High\par 46% In range\par 1% Low\par <1% Very low \par \par Glucose Sensor Necessity:  This patient with diabetes performs 4 or more glucose checks per day utilizing a continuous blood glucose monitor.  The patient is treated with insulin via 4 or more injections daily . This patient requires frequent adjustments to their insulin treatment on the basis of therapeutic continuous glucose monitoring results.  In addition, the patient has been to our office for an evaluation of their diabetes control within the past 6 months.  \par  \par Last eye exam: over one year, no changes in vision\par Last foot exam: in office, denies pain/numbness/tingling in B/L feet\par Diet: carb counting\par Weight: stable\par Exercise: weight training 3-4x per week\par \par Also with hypothyroidism, on LT4 50 mcg daily. Takes on an empty stomach at least one hour before eating with other medications. \par

## 2022-09-29 NOTE — ASSESSMENT
[FreeTextEntry1] : 47 year old female with T1DM, hypothyroidism, hypertension, hyperlipidemia. \par Bilateral mastectomy for breast ac with breast implants. Snow flakes cataract. \par \par 1. T1DM: \par - Pt to be taught OMNIPOD as soon as possible by CDE team\par - Need updated HGA1C\par - No changes to her injection settings but she will benefit from two different basal pattern settings (for work days and non work days)\par - continue Dexcom.\par - discussed diet and exercise. \par - encouraged more exercise walking 30 min 3 x week\par - all lab results reviewed and discussed with patient. All questions answered\par - make ophtho exam\par - continue with weight lifting \par - cont ARB \par \par 2. Hypothyroid\par - Need updated TFTs\par -Continue current dose of LT4.\par \par 3. Hyperlipidemia: Continue statin. Need updated lipid panel\par low fat/low cholesterol diet and weight loss advised\par \par 4. Hypertension- bp at target on meds. continue current management \par I advised low fat/low cholesterol diet, low salt diet, and weight loss\par \par 5. Hypercalcemia- Need updated calcium levels \par Followed by oncologist (breast cancer) \par \par 6. vitamin d deficiency : continue MVI \par Need updated vit d levels\par \par RTO CDE ASAP FOR PUMP TEACHING\par 4 WEEKS MD

## 2022-10-06 RX ORDER — BLOOD SUGAR DIAGNOSTIC
STRIP MISCELLANEOUS
Qty: 4 | Refills: 1 | Status: ACTIVE | COMMUNITY
Start: 2022-09-29 | End: 1900-01-01

## 2022-10-13 ENCOUNTER — APPOINTMENT (OUTPATIENT)
Dept: ENDOCRINOLOGY | Facility: CLINIC | Age: 47
End: 2022-10-13

## 2022-10-13 PROCEDURE — P0004: CPT

## 2022-10-14 ENCOUNTER — TRANSCRIPTION ENCOUNTER (OUTPATIENT)
Age: 47
End: 2022-10-14

## 2022-10-18 ENCOUNTER — TRANSCRIPTION ENCOUNTER (OUTPATIENT)
Age: 47
End: 2022-10-18

## 2022-10-19 ENCOUNTER — TRANSCRIPTION ENCOUNTER (OUTPATIENT)
Age: 47
End: 2022-10-19

## 2022-10-21 LAB
HBA1C MFR BLD HPLC: 7.2
LDLC SERPL DIRECT ASSAY-MCNC: 101
TSH SERPL-ACNC: 2.4

## 2022-10-24 ENCOUNTER — TRANSCRIPTION ENCOUNTER (OUTPATIENT)
Age: 47
End: 2022-10-24

## 2022-10-24 ENCOUNTER — APPOINTMENT (OUTPATIENT)
Dept: ENDOCRINOLOGY | Facility: CLINIC | Age: 47
End: 2022-10-24

## 2022-10-24 VITALS
SYSTOLIC BLOOD PRESSURE: 100 MMHG | WEIGHT: 155.13 LBS | TEMPERATURE: 97.3 F | HEIGHT: 64 IN | BODY MASS INDEX: 26.49 KG/M2 | DIASTOLIC BLOOD PRESSURE: 60 MMHG | RESPIRATION RATE: 14 BRPM | HEART RATE: 100 BPM | OXYGEN SATURATION: 98 %

## 2022-10-24 PROCEDURE — 95251 CONT GLUC MNTR ANALYSIS I&R: CPT

## 2022-10-24 PROCEDURE — 99215 OFFICE O/P EST HI 40 MIN: CPT | Mod: 25

## 2022-10-24 RX ORDER — AMITRIPTYLINE HYDROCHLORIDE 10 MG/1
10 TABLET, FILM COATED ORAL
Refills: 0 | Status: DISCONTINUED | COMMUNITY
End: 2022-10-24

## 2022-10-24 RX ORDER — TRETINOIN 0.5 MG/G
0.05 LOTION TOPICAL
Qty: 20 | Refills: 0 | Status: ACTIVE | COMMUNITY
Start: 2022-07-22

## 2022-10-24 RX ORDER — FOLIC ACID 1 MG/1
1 TABLET ORAL
Qty: 90 | Refills: 0 | Status: DISCONTINUED | COMMUNITY
Start: 2022-05-02 | End: 2022-10-24

## 2022-10-25 NOTE — ASSESSMENT
[Exercise/Effect on Glucose] : exercise/effect on glucose [Self Monitoring of Blood Glucose] : self monitoring of blood glucose [Retinopathy Screening] : Patient was referred to ophthalmology for retinopathy screening [FreeTextEntry1] : 44 year old female with T1DM, hypothyroidism, hypertension, hyperlipidemia.  \par Bilateral mastectomy for breast ac with breast implants. Snow flakes cataract. \par \par 1. T1DM:  A1c 7.2. \par michael is using omnipod with dexcom now and very happy. \par She still mismatches for lunch but countign better. Will lower iCR for lunch to 1:8. Thats the heaviest meal of her days. continue Dexcom.\par discussed diet and exercise. \par eye exam done; no DR\par darwin/c ratio. Cont ARB\par foot exam normal today: normal monofilament and vibratory B/L\par cgm reviewed : target Bgs overnight and in the early morning. sh ehas hyperG 200-250 after lunch constantly . \par She has hyperG after dinner milder. No hypoG \par \par 2. Hypothyroid\par pt euthyroid clinically and biochemically.\par Continue current dose of LT4.\par \par 3. Hyperlipidemia: lipids excellent. Continue statin.\par low fat/low cholesterol diet \par \par 4. Hypertension-  bp at target on meds. continue current management \par I advised low fat/low cholesterol diet, low salt diet\par \par 5. Hypercalcemia- serum calcium normal. \par Followed by oncologist (breast cancer) \par \par 6 . vitamin d deficiency : continue MVI \par

## 2022-10-25 NOTE — PHYSICAL EXAM
[Alert] : alert [Well Nourished] : well nourished [No Acute Distress] : no acute distress [Well Developed] : well developed [Normal Sclera/Conjunctiva] : normal sclera/conjunctiva [EOMI] : extra ocular movement intact [No Proptosis] : no proptosis [Normal Oropharynx] : the oropharynx was normal [Thyroid Not Enlarged] : the thyroid was not enlarged [No Thyroid Nodules] : no palpable thyroid nodules [No Respiratory Distress] : no respiratory distress [No Accessory Muscle Use] : no accessory muscle use [Clear to Auscultation] : lungs were clear to auscultation bilaterally [Normal S1, S2] : normal S1 and S2 [Normal Rate] : heart rate was normal [Regular Rhythm] : with a regular rhythm [No Edema] : no peripheral edema [Pedal Pulses Normal] : the pedal pulses are present [Normal Bowel Sounds] : normal bowel sounds [Not Tender] : non-tender [Not Distended] : not distended [Soft] : abdomen soft [Normal Anterior Cervical Nodes] : no anterior cervical lymphadenopathy [No Tremors] : no tremors [Oriented x3] : oriented to person, place, and time [Right foot was examined, including] : right foot ~C was examined, including visual inspection with sensory and pulse exams [Left foot was examined, including] : left foot ~C was examined, including visual inspection with sensory and pulse exams [Normal] : normal [Full ROM] : with full range of motion [Acanthosis Nigricans] : no acanthosis nigricans [Delayed in the Right Toes] : normal in the toes [Delayed in the Left Toes] : normal in the toes [Vibration Dec.] : normal vibratory sensation at the level of the toes [Position Sense Dec.] : normal position sense at the level of the toes

## 2022-11-03 ENCOUNTER — TRANSCRIPTION ENCOUNTER (OUTPATIENT)
Age: 47
End: 2022-11-03

## 2022-11-05 RX ORDER — INSULIN PMP CART,AUT,G6/7,CNTR
EACH SUBCUTANEOUS
Qty: 9 | Refills: 1 | Status: ACTIVE | COMMUNITY
Start: 2022-09-14 | End: 1900-01-01

## 2022-11-07 ENCOUNTER — TRANSCRIPTION ENCOUNTER (OUTPATIENT)
Age: 47
End: 2022-11-07

## 2022-11-07 ENCOUNTER — NON-APPOINTMENT (OUTPATIENT)
Age: 47
End: 2022-11-07

## 2022-11-08 ENCOUNTER — TRANSCRIPTION ENCOUNTER (OUTPATIENT)
Age: 47
End: 2022-11-08

## 2022-12-22 ENCOUNTER — TRANSCRIPTION ENCOUNTER (OUTPATIENT)
Age: 47
End: 2022-12-22

## 2022-12-28 ENCOUNTER — TRANSCRIPTION ENCOUNTER (OUTPATIENT)
Age: 47
End: 2022-12-28

## 2022-12-28 RX ORDER — INSULIN PEN,REUSABLE,BT LISPRO
INSULIN PEN (EA) SUBCUTANEOUS
Qty: 1 | Refills: 0 | Status: DISCONTINUED | COMMUNITY
Start: 2022-12-21 | End: 2022-12-28

## 2022-12-28 RX ORDER — INSULIN PEN,REUSABLE,BT LISPRO
INSULIN PEN (EA) SUBCUTANEOUS
Qty: 1 | Refills: 1 | Status: DISCONTINUED | COMMUNITY
Start: 2022-12-22 | End: 2022-12-28

## 2022-12-30 ENCOUNTER — TRANSCRIPTION ENCOUNTER (OUTPATIENT)
Age: 47
End: 2022-12-30

## 2023-01-03 ENCOUNTER — TRANSCRIPTION ENCOUNTER (OUTPATIENT)
Age: 48
End: 2023-01-03

## 2023-01-12 ENCOUNTER — TRANSCRIPTION ENCOUNTER (OUTPATIENT)
Age: 48
End: 2023-01-12

## 2023-01-23 ENCOUNTER — APPOINTMENT (OUTPATIENT)
Dept: ENDOCRINOLOGY | Facility: CLINIC | Age: 48
End: 2023-01-23
Payer: COMMERCIAL

## 2023-01-23 VITALS
OXYGEN SATURATION: 100 % | BODY MASS INDEX: 26.55 KG/M2 | SYSTOLIC BLOOD PRESSURE: 100 MMHG | HEART RATE: 99 BPM | RESPIRATION RATE: 14 BRPM | DIASTOLIC BLOOD PRESSURE: 60 MMHG | HEIGHT: 64 IN | WEIGHT: 155.5 LBS | TEMPERATURE: 98 F

## 2023-01-23 PROCEDURE — 95251 CONT GLUC MNTR ANALYSIS I&R: CPT

## 2023-01-23 PROCEDURE — 99214 OFFICE O/P EST MOD 30 MIN: CPT | Mod: 25

## 2023-01-23 RX ORDER — GLUCAGON 3 MG/1
3 POWDER NASAL
Qty: 1 | Refills: 3 | Status: ACTIVE | COMMUNITY
Start: 2023-01-23 | End: 1900-01-01

## 2023-01-23 RX ORDER — INSULIN LISPRO 100 [IU]/ML
100 INJECTION, SOLUTION INTRAVENOUS; SUBCUTANEOUS
Qty: 45 | Refills: 1 | Status: DISCONTINUED | COMMUNITY
Start: 2022-01-19 | End: 2023-01-23

## 2023-01-23 NOTE — HISTORY OF PRESENT ILLNESS
[FreeTextEntry1] : followup for dm 1\par breast cancer s/p surgery and chemoT now s/p reconstructions

## 2023-01-23 NOTE — ASSESSMENT
[Exercise/Effect on Glucose] : exercise/effect on glucose [Hypoglycemia Management] : hypoglycemia management [Retinopathy Screening] : Patient was referred to ophthalmology for retinopathy screening [FreeTextEntry1] : 44 year old female with T1DM, hypothyroidism, hypertension, hyperlipidemia.  \par Bilateral mastectomy for breast ac with breast implants. Snow flakes cataract. \par She stopped omnipod due to local skin reactions and now she takes inpen. \par \par 1. T1DM:  A1c 6.7  \par continue the inpens . She loves them. \par contineu tresiba 33 u am . works better in am \par cgm report revised . Will lower iCR for lunch to 1:5. \par cgm report revised and d/w pt. Target Bgs 74% and high 16 %, very high 4 %. \par discussed diet and exercise. \par eye exam done; no DR\par darwin/c ratio. Cont ARB\par she needs a new PCP \par eye exam utd \par \par 2. Hypothyroid: Continue current dose of LT4.\par \par 3. Hyperlipidemia: lipids excellent. Continue statin.\par \par 4. Hypertension-  bp at target on meds. continue current management \par I advised low fat/low cholesterol diet\par \par 5. Hypercalcemia- serum calcium normal. \par Followed by oncologist (breast cancer) \par \par 6 . vitamin d deficiency : continue MVI \par

## 2023-03-14 ENCOUNTER — TRANSCRIPTION ENCOUNTER (OUTPATIENT)
Age: 48
End: 2023-03-14

## 2023-03-16 ENCOUNTER — APPOINTMENT (OUTPATIENT)
Dept: FAMILY MEDICINE | Facility: CLINIC | Age: 48
End: 2023-03-16
Payer: COMMERCIAL

## 2023-03-16 VITALS
OXYGEN SATURATION: 98 % | RESPIRATION RATE: 14 BRPM | DIASTOLIC BLOOD PRESSURE: 78 MMHG | HEIGHT: 64 IN | SYSTOLIC BLOOD PRESSURE: 128 MMHG | HEART RATE: 95 BPM | WEIGHT: 155 LBS | BODY MASS INDEX: 26.46 KG/M2 | TEMPERATURE: 97.9 F

## 2023-03-16 DIAGNOSIS — Z83.438 FAMILY HISTORY OF OTHER DISORDER OF LIPOPROTEIN METABOLISM AND OTHER LIPIDEMIA: ICD-10-CM

## 2023-03-16 DIAGNOSIS — E88.81 METABOLIC SYNDROME: ICD-10-CM

## 2023-03-16 DIAGNOSIS — Z83.3 FAMILY HISTORY OF DIABETES MELLITUS: ICD-10-CM

## 2023-03-16 DIAGNOSIS — M85.80 OTHER SPECIFIED DISORDERS OF BONE DENSITY AND STRUCTURE, UNSPECIFIED SITE: ICD-10-CM

## 2023-03-16 DIAGNOSIS — Z76.89 PERSONS ENCOUNTERING HEALTH SERVICES IN OTHER SPECIFIED CIRCUMSTANCES: ICD-10-CM

## 2023-03-16 DIAGNOSIS — Z80.49 FAMILY HISTORY OF MALIGNANT NEOPLASM OF OTHER GENITAL ORGANS: ICD-10-CM

## 2023-03-16 DIAGNOSIS — Z12.11 ENCOUNTER FOR SCREENING FOR MALIGNANT NEOPLASM OF COLON: ICD-10-CM

## 2023-03-16 LAB — CYTOLOGY CVX/VAG DOC THIN PREP: NORMAL

## 2023-03-16 PROCEDURE — 99204 OFFICE O/P NEW MOD 45 MIN: CPT

## 2023-03-16 NOTE — HISTORY OF PRESENT ILLNESS
[FreeTextEntry1] : presents as new patient to establish care with primary\par and also medication renewal (endocrinologist stated needs to obtain med from PCP)  [de-identified] : Presenting in office to establish care, she is currently under care of Dr. Jimenez for type 1 diabetes. 20 years ago she was placed on Diovan for kidney protection for type one diabetes. Her endocrinologist no longer prescribes the Diovan for her. She is finding she is having a component of insulin resistance, she has been requiring more insulin for lower amount of carbohydrates, has noticed it has been worsening each year. She has noticed some weeks her blood glucose will spike for the week and she cannot get it back down. She has done some reading and had read that possibly metformin could help with her insulin resistance. She does work a very high stress job. She has been put into surgical menopause over 10 years ago.

## 2023-03-16 NOTE — HEALTH RISK ASSESSMENT
[Patient reported PAP Smear was normal] : Patient reported PAP Smear was normal [Patient reported bone density results were abnormal] : Patient reported bone density results were abnormal [Former] : Former [MammogramComments] : due for Breast MRI- h as implants  [PapSmearComments] : does vaginal paps as she had MARY [BoneDensityDate] : 2022 [BoneDensityComments] : osteopenia  [ColonoscopyDate] : due

## 2023-03-16 NOTE — PLAN
[FreeTextEntry1] : Established \par \par Insulin resistance\par will consult with endocrinologist\par

## 2023-04-04 ENCOUNTER — TRANSCRIPTION ENCOUNTER (OUTPATIENT)
Age: 48
End: 2023-04-04

## 2023-04-25 ENCOUNTER — APPOINTMENT (OUTPATIENT)
Dept: ENDOCRINOLOGY | Facility: CLINIC | Age: 48
End: 2023-04-25
Payer: COMMERCIAL

## 2023-04-25 VITALS
HEIGHT: 64 IN | DIASTOLIC BLOOD PRESSURE: 78 MMHG | BODY MASS INDEX: 26.8 KG/M2 | WEIGHT: 157 LBS | OXYGEN SATURATION: 98 % | HEART RATE: 70 BPM | SYSTOLIC BLOOD PRESSURE: 122 MMHG

## 2023-04-25 PROCEDURE — 95251 CONT GLUC MNTR ANALYSIS I&R: CPT

## 2023-04-25 PROCEDURE — 99215 OFFICE O/P EST HI 40 MIN: CPT | Mod: 25

## 2023-04-25 NOTE — ASSESSMENT
[FreeTextEntry1] : 44 year old female with T1DM, hypothyroidism, hypertension, hyperlipidemia.  \par Bilateral mastectomy for breast ac with breast implants. Snow flakes cataract. \par She stopped omnipod due to local skin reactions and now she takes inpen. \par \par 1. T1DM:  A1c 6.7  \par continue the inpens . She loves them. \par continue tresiba 29 u HS.  \par cgm report revised : target Bgs 71% , high 22 %, very high 4 % , low Bgs 2%   \par eye exam done; no DR\par darwin/c ratio. Cont ARB\par eye exam utd \par Abnormal LFts ; cont to monitor. She does not take any supplements. \par \par 2. Hypothyroid: Continue current dose of LT4.\par \par 3. Hyperlipidemia: Continue statin.\par \par 4. HTN:  bp at target on meds. Continue current management.\par \par 5. Hypercalcemia- serum calcium normal. normal now. \par Followed by oncologist (breast cancer) \par \par 6 . vitamin d deficiency : continue MVI \par

## 2023-06-28 ENCOUNTER — TRANSCRIPTION ENCOUNTER (OUTPATIENT)
Age: 48
End: 2023-06-28

## 2023-06-30 ENCOUNTER — RX RENEWAL (OUTPATIENT)
Age: 48
End: 2023-06-30

## 2023-08-23 ENCOUNTER — TRANSCRIPTION ENCOUNTER (OUTPATIENT)
Age: 48
End: 2023-08-23

## 2023-08-24 ENCOUNTER — TRANSCRIPTION ENCOUNTER (OUTPATIENT)
Age: 48
End: 2023-08-24

## 2023-08-28 ENCOUNTER — APPOINTMENT (OUTPATIENT)
Dept: ENDOCRINOLOGY | Facility: CLINIC | Age: 48
End: 2023-08-28
Payer: COMMERCIAL

## 2023-08-28 VITALS
SYSTOLIC BLOOD PRESSURE: 110 MMHG | HEIGHT: 64 IN | WEIGHT: 157 LBS | DIASTOLIC BLOOD PRESSURE: 60 MMHG | HEART RATE: 110 BPM | BODY MASS INDEX: 26.8 KG/M2 | OXYGEN SATURATION: 98 %

## 2023-08-28 DIAGNOSIS — E55.9 VITAMIN D DEFICIENCY, UNSPECIFIED: ICD-10-CM

## 2023-08-28 PROCEDURE — 95251 CONT GLUC MNTR ANALYSIS I&R: CPT

## 2023-08-28 PROCEDURE — 99215 OFFICE O/P EST HI 40 MIN: CPT | Mod: 25

## 2023-08-28 RX ORDER — INSULIN LISPRO 100 [IU]/ML
100 INJECTION, SOLUTION INTRAVENOUS; SUBCUTANEOUS
Qty: 3 | Refills: 1 | Status: ACTIVE | COMMUNITY
Start: 2022-12-22 | End: 1900-01-01

## 2023-09-25 ENCOUNTER — NON-APPOINTMENT (OUTPATIENT)
Age: 48
End: 2023-09-25

## 2023-09-26 ENCOUNTER — TRANSCRIPTION ENCOUNTER (OUTPATIENT)
Age: 48
End: 2023-09-26

## 2023-10-02 ENCOUNTER — APPOINTMENT (OUTPATIENT)
Dept: FAMILY MEDICINE | Facility: CLINIC | Age: 48
End: 2023-10-02
Payer: COMMERCIAL

## 2023-10-02 VITALS
DIASTOLIC BLOOD PRESSURE: 60 MMHG | SYSTOLIC BLOOD PRESSURE: 112 MMHG | BODY MASS INDEX: 26.46 KG/M2 | HEIGHT: 64 IN | TEMPERATURE: 99 F | RESPIRATION RATE: 14 BRPM | WEIGHT: 155 LBS | HEART RATE: 93 BPM | OXYGEN SATURATION: 99 %

## 2023-10-02 PROCEDURE — 99214 OFFICE O/P EST MOD 30 MIN: CPT

## 2023-12-04 ENCOUNTER — APPOINTMENT (OUTPATIENT)
Dept: FAMILY MEDICINE | Facility: CLINIC | Age: 48
End: 2023-12-04
Payer: COMMERCIAL

## 2023-12-04 VITALS
OXYGEN SATURATION: 96 % | TEMPERATURE: 98.6 F | BODY MASS INDEX: 26.98 KG/M2 | HEART RATE: 94 BPM | WEIGHT: 158 LBS | SYSTOLIC BLOOD PRESSURE: 100 MMHG | RESPIRATION RATE: 14 BRPM | DIASTOLIC BLOOD PRESSURE: 60 MMHG | HEIGHT: 64 IN

## 2023-12-04 PROCEDURE — 99214 OFFICE O/P EST MOD 30 MIN: CPT

## 2023-12-14 ENCOUNTER — TRANSCRIPTION ENCOUNTER (OUTPATIENT)
Age: 48
End: 2023-12-14

## 2023-12-14 RX ORDER — INSULIN PEN,REUSABLE,BT LISPRO
INSULIN PEN (EA) SUBCUTANEOUS
Qty: 1 | Refills: 2 | Status: ACTIVE | COMMUNITY
Start: 2022-12-28 | End: 1900-01-01

## 2023-12-14 RX ORDER — INSULIN PEN,REUSABLE,BT LISPRO
INSULIN PEN (EA) SUBCUTANEOUS
Qty: 1 | Refills: 0 | Status: ACTIVE | COMMUNITY
Start: 2023-01-12 | End: 1900-01-01

## 2023-12-19 ENCOUNTER — RX RENEWAL (OUTPATIENT)
Age: 48
End: 2023-12-19

## 2023-12-20 ENCOUNTER — TRANSCRIPTION ENCOUNTER (OUTPATIENT)
Age: 48
End: 2023-12-20

## 2023-12-21 ENCOUNTER — APPOINTMENT (OUTPATIENT)
Dept: PLASTIC SURGERY | Facility: CLINIC | Age: 48
End: 2023-12-21
Payer: COMMERCIAL

## 2023-12-21 VITALS
DIASTOLIC BLOOD PRESSURE: 84 MMHG | SYSTOLIC BLOOD PRESSURE: 128 MMHG | BODY MASS INDEX: 26.98 KG/M2 | OXYGEN SATURATION: 100 % | HEIGHT: 64 IN | HEART RATE: 96 BPM | WEIGHT: 158 LBS

## 2023-12-21 DIAGNOSIS — Z85.3 PERSONAL HISTORY OF MALIGNANT NEOPLASM OF BREAST: ICD-10-CM

## 2023-12-21 DIAGNOSIS — T85.848A PAIN DUE TO OTHER INTERNAL PROSTHETIC DEVICES, IMPLANTS AND GRAFTS, INITIAL ENCOUNTER: ICD-10-CM

## 2023-12-21 PROCEDURE — 99204 OFFICE O/P NEW MOD 45 MIN: CPT

## 2023-12-22 ENCOUNTER — TRANSCRIPTION ENCOUNTER (OUTPATIENT)
Age: 48
End: 2023-12-22

## 2023-12-22 ENCOUNTER — APPOINTMENT (OUTPATIENT)
Dept: FAMILY MEDICINE | Facility: CLINIC | Age: 48
End: 2023-12-22
Payer: COMMERCIAL

## 2023-12-22 DIAGNOSIS — F33.1 MAJOR DEPRESSIVE DISORDER, RECURRENT, MODERATE: ICD-10-CM

## 2023-12-22 PROCEDURE — 99442: CPT

## 2023-12-22 NOTE — PLAN
[FreeTextEntry1] : depression positive response to medication will increase dose from 150 to 300mg daily she will follow up again via telephonic or in person in 3 weeks continue with support groups she will call office if any side effects develop

## 2023-12-22 NOTE — HISTORY OF PRESENT ILLNESS
[Home] : at home, [unfilled] , at the time of the visit. [Medical Office: ([facility])___] : at the medical office located in  [Verbal consent obtained from patient] : the patient, [unfilled] [de-identified] : Presenting via telephonic for follow up after starting wellbutrin 150 xr for depression/anxiety r.t ongoing issues and rejection of breast implants. She felt the first day she took the medication she felt great and feels that she has become used to the medication. Overall she has had a positive response but still does not feel great.

## 2023-12-27 ENCOUNTER — TRANSCRIPTION ENCOUNTER (OUTPATIENT)
Age: 48
End: 2023-12-27

## 2023-12-27 NOTE — PHYSICAL EXAM
[Alert] : alert [Well Nourished] : well nourished [No Acute Distress] : no acute distress [Well Developed] : well developed [Normal Sclera/Conjunctiva] : normal sclera/conjunctiva [EOMI] : extra ocular movement intact [No Proptosis] : no proptosis [Normal Oropharynx] : the oropharynx was normal [Thyroid Not Enlarged] : the thyroid was not enlarged [No Thyroid Nodules] : no palpable thyroid nodules [No Respiratory Distress] : no respiratory distress [No Accessory Muscle Use] : no accessory muscle use [Clear to Auscultation] : lungs were clear to auscultation bilaterally [Normal S1, S2] : normal S1 and S2 [Normal Rate] : heart rate was normal [Regular Rhythm] : with a regular rhythm [No Edema] : no peripheral edema [Pedal Pulses Normal] : the pedal pulses are present [Normal Bowel Sounds] : normal bowel sounds [Not Tender] : non-tender [Not Distended] : not distended [Soft] : abdomen soft [No Tremors] : no tremors [Oriented x3] : oriented to person, place, and time [Acanthosis Nigricans] : no acanthosis nigricans

## 2023-12-27 NOTE — ASSESSMENT
[FreeTextEntry1] : 44 year old female with T1DM, hypothyroidism, hypertension, hyperlipidemia.   Bilateral mastectomy for breast ac with breast implants. Snow flakes cataract.  She stopped omnipod due to local skin reactions and now she takes in pen.   1. T1DM:  A1c 7. she had breast infection again.,  cgm report revised and d/w pt: taregt Bgs 66% and high 22 % , very high 5% continue the inpens . She loves them.  continue tresiba 34 u HS.   cgm report revised : target Bgs 71% , high 22 %, very high 4 % , low Bgs 2%    eye exam UTD  darwin/c ratio. Cont ARB eye exam utd  Abnormal LFts ; cont to monitor. She does not take any supplements.  This patient with diabetes performs 4 or more glucose checks per day utilizing a home blood glucose monitor. The patient is treated with insulin via 3 or more injections daily (or a subcutaneous insulin infusion pump.) This patient requires frequent adjustments to their insulin treatment on the basis of therapeutic continuous glucose monitoring results. In addition, the patient has been to our office for an evaluation of their diabetes control within the past 6 months.    2. Hypothyroid / Hashimoto's  pt euthyroid clinically and biochemically. Continue current dose of LT4.  3. Hyperlipidemia: lipids very good. Cont statin.   4. HTN:  bp at target on meds. Continue current management.  5. Hypercalcemia- serum calcium normal. normal now.  Followed by oncologist (breast cancer)  check PTHi, 1,25diOHD, 25 OHD , PTHrp, SPEP  tfts normal.   6 . vitamin d deficiency : continue MVI   7 . Osteopenia: secondary to chemoT and estrogen Rc blockers.

## 2024-01-04 ENCOUNTER — TRANSCRIPTION ENCOUNTER (OUTPATIENT)
Age: 49
End: 2024-01-04

## 2024-01-04 RX ORDER — INSULIN PEN,REUSABLE,BT LISPRO
INSULIN PEN (EA) SUBCUTANEOUS
Qty: 1 | Refills: 0 | Status: ACTIVE | COMMUNITY
Start: 2024-01-04 | End: 1900-01-01

## 2024-01-08 ENCOUNTER — TRANSCRIPTION ENCOUNTER (OUTPATIENT)
Age: 49
End: 2024-01-08

## 2024-01-27 LAB — HBA1C MFR BLD HPLC: 7

## 2024-01-29 ENCOUNTER — APPOINTMENT (OUTPATIENT)
Dept: ENDOCRINOLOGY | Facility: CLINIC | Age: 49
End: 2024-01-29
Payer: COMMERCIAL

## 2024-01-29 VITALS
BODY MASS INDEX: 26.98 KG/M2 | HEART RATE: 107 BPM | SYSTOLIC BLOOD PRESSURE: 134 MMHG | DIASTOLIC BLOOD PRESSURE: 72 MMHG | OXYGEN SATURATION: 99 % | RESPIRATION RATE: 16 BRPM | HEIGHT: 64 IN | WEIGHT: 158 LBS

## 2024-01-29 DIAGNOSIS — E21.0 PRIMARY HYPERPARATHYROIDISM: ICD-10-CM

## 2024-01-29 PROCEDURE — 95251 CONT GLUC MNTR ANALYSIS I&R: CPT

## 2024-01-29 PROCEDURE — 99215 OFFICE O/P EST HI 40 MIN: CPT

## 2024-01-29 NOTE — ASSESSMENT
[Exercise/Effect on Glucose] : exercise/effect on glucose [Retinopathy Screening] : Patient was referred to ophthalmology for retinopathy screening [FreeTextEntry1] : 44 year old female with T1DM, hypothyroidism, hypertension, hyperlipidemia.   Bilateral mastectomy for breast ac with breast implants. Snow flakes cataract.  She stopped omnipod due to local skin reactions and now she takes in pen.   1. T1DM:  A1c 6.8 . she will go for removal  of breast implants due to her infection.  cgm report revised and d/w pt: target Bgs z 59% and high 30%  hyperG with lunch and idndner. : lower ICR lunch 1:6.5 and dinner 1:7  continue the inpens .  continue tresiba 34 u HS.   eye exam UTD  Cont ARB Abnormal LFts : chronic Abx for 3 yrs.   This patient with diabetes performs 4 or more glucose checks per day utilizing a home blood glucose monitor. The patient is treated with insulin via 3 or more injections daily (or a subcutaneous insulin infusion pump.) This patient requires frequent adjustments to their insulin treatment on the basis of therapeutic continuous glucose monitoring results. In addition, the patient has been to our office for an evaluation of their diabetes control within the past 6 months.  she is optimized medically from endocrine perspective for upcoming breast surgery daryl day of the surgery take only half of the basal insulin . No meal insulin on the day of surgery until she eats again full meal.   2. Hypothyroid / Hashimoto's  pt euthyroid clinically and biochemically. Continue current dose of LT4.  3. Hyperlipidemia: lipids very good. Cont statin.   4. HTN:  bp at target on meds. Continue current management.  5. Hypercalcemia- serum calcium normal. normal now.  Followed by oncologist (breast cancer)  check PTHi, 1,25diOHD, 25 OHD , PTHrp, SPEP  tfts normal.  24 hr urine pending and DXA pending.   6 . vitamin d deficiency : continue MVI   7 . Osteopenia: secondary to chemoT and estrogen Rc blockers.

## 2024-02-05 NOTE — ASSESSMENT
[FreeTextEntry1] : 48 year yo female with a history of bilateral silicone , bilateral painful, visible, capsular contractures, (Baker IV) and a concern for anaplastic large cell lymphoma (ALCL) and systemic issues stemming from implants.   I feel that it is a reasonable plan to go to the operating room to remove both breast implants, and perform bilateral periprosthetic capsulectomies with bilateral flat closure. In addition to addressing the above concerns, this may make breast cancer surveillance easier in the future.    Risks, benefits, and alternatives were discussed including the risks of infection, bleeding, seroma, hematoma, asymmetry, nipple necrosis, change in breast skin sensation, fat necrosis, oil cysts, poor scarring, keloid formation, hypertrophic scarring, dehiscence, and delayed wound healing.  The patient understands these risks, all questions were answered and she wishes to proceed with bilateral breast implant removal, bilateral periprosthetic capsulectomy, bilateral reconstructive mastopexy to restore volume lost to atrophy directly related to the implants.  Informed consent was obtained.

## 2024-02-05 NOTE — PHYSICAL EXAM
[NI] : Normal [de-identified] : Please see scanned in breast sheet SN-N: L- 19.5 R- 20 N-IMF: L- 10.5, R- 9.5 BW: L/R: 14

## 2024-02-05 NOTE — HISTORY OF PRESENT ILLNESS
[FreeTextEntry1] : Ms. FERNANDO FLOYD  is a 48 year yo female  who has a history of bilateral breast augmentation, who has developed bilateral breast pain associated with periprosthetic scarring. She  complains of bilateral breast pain associated with her  breast implants. She  is unable to sleep on her  stomach due to severe pain associated with the breast implants.  She  is also unable to do activities of daily life like running, biking, raising her arms above her head or out to the side, and lifting.  In 2014 she had a bilateral mastectomy with insertion of tissue expanders. She then had a tissue expander exchange to implants at [place]. She then developed capsular contracture, in which she returned to the OR to exchange her implants to textured silicone implants. In 2020, she developed a left breast seroma in which needed to be aspirated. In 2021 she developed frequent infections that needed antibiotic treatments every 6 months.    She  states that she  has never felt comfortable with implants and is worried about the health risks.  She is concerned about the risk of anaplastic large cell lymphoma.   She  has worried about the health implications of the implants, and she  has had some systemic issues which she  is unsure are related to the implants.  Specifically has complaints of joint pain, hair loss, chronic fatigue, vision changes, and poor memory. She  has been recommended to have the implants removed.        She  does not wish to have any more implants placed and I would not recommend doing that given the health concerns, and concerns for ALCL, and the painful bilateral capsular contractures

## 2024-02-05 NOTE — REASON FOR VISIT
[Consultation] : a consultation visit [FreeTextEntry1] : bilateral breast implant removal, history of breast cancer

## 2024-02-09 ENCOUNTER — TRANSCRIPTION ENCOUNTER (OUTPATIENT)
Age: 49
End: 2024-02-09

## 2024-02-14 ENCOUNTER — OUTPATIENT (OUTPATIENT)
Dept: OUTPATIENT SERVICES | Facility: HOSPITAL | Age: 49
LOS: 1 days | End: 2024-02-14
Payer: COMMERCIAL

## 2024-02-14 ENCOUNTER — RESULT REVIEW (OUTPATIENT)
Age: 49
End: 2024-02-14

## 2024-02-14 VITALS
HEART RATE: 92 BPM | HEIGHT: 64 IN | TEMPERATURE: 98 F | OXYGEN SATURATION: 100 % | DIASTOLIC BLOOD PRESSURE: 71 MMHG | RESPIRATION RATE: 16 BRPM | SYSTOLIC BLOOD PRESSURE: 121 MMHG | WEIGHT: 158.95 LBS

## 2024-02-14 DIAGNOSIS — Z90.710 ACQUIRED ABSENCE OF BOTH CERVIX AND UTERUS: Chronic | ICD-10-CM

## 2024-02-14 DIAGNOSIS — Z98.890 OTHER SPECIFIED POSTPROCEDURAL STATES: Chronic | ICD-10-CM

## 2024-02-14 DIAGNOSIS — Z01.818 ENCOUNTER FOR OTHER PREPROCEDURAL EXAMINATION: ICD-10-CM

## 2024-02-14 DIAGNOSIS — Z98.891 HISTORY OF UTERINE SCAR FROM PREVIOUS SURGERY: Chronic | ICD-10-CM

## 2024-02-14 DIAGNOSIS — Z98.82 BREAST IMPLANT STATUS: Chronic | ICD-10-CM

## 2024-02-14 DIAGNOSIS — Z90.13 ACQUIRED ABSENCE OF BILATERAL BREASTS AND NIPPLES: Chronic | ICD-10-CM

## 2024-02-14 LAB
ABO RH CONFIRMATION: SIGNIFICANT CHANGE UP
ANION GAP SERPL CALC-SCNC: 4 MMOL/L — LOW (ref 5–17)
APPEARANCE UR: CLEAR — SIGNIFICANT CHANGE UP
APTT BLD: 29 SEC — SIGNIFICANT CHANGE UP (ref 24.5–35.6)
BASOPHILS # BLD AUTO: 0.07 K/UL — SIGNIFICANT CHANGE UP (ref 0–0.2)
BASOPHILS NFR BLD AUTO: 0.7 % — SIGNIFICANT CHANGE UP (ref 0–2)
BILIRUB UR-MCNC: NEGATIVE — SIGNIFICANT CHANGE UP
BLD GP AB SCN SERPL QL: SIGNIFICANT CHANGE UP
BUN SERPL-MCNC: 17 MG/DL — SIGNIFICANT CHANGE UP (ref 7–23)
CALCIUM SERPL-MCNC: 9.9 MG/DL — SIGNIFICANT CHANGE UP (ref 8.5–10.1)
CHLORIDE SERPL-SCNC: 102 MMOL/L — SIGNIFICANT CHANGE UP (ref 96–108)
CO2 SERPL-SCNC: 31 MMOL/L — SIGNIFICANT CHANGE UP (ref 22–31)
COLOR SPEC: YELLOW — SIGNIFICANT CHANGE UP
CREAT SERPL-MCNC: 0.97 MG/DL — SIGNIFICANT CHANGE UP (ref 0.5–1.3)
DIFF PNL FLD: NEGATIVE — SIGNIFICANT CHANGE UP
EGFR: 72 ML/MIN/1.73M2 — SIGNIFICANT CHANGE UP
EOSINOPHIL # BLD AUTO: 0.1 K/UL — SIGNIFICANT CHANGE UP (ref 0–0.5)
EOSINOPHIL NFR BLD AUTO: 1 % — SIGNIFICANT CHANGE UP (ref 0–6)
GLUCOSE SERPL-MCNC: 177 MG/DL — HIGH (ref 70–99)
GLUCOSE UR QL: NEGATIVE MG/DL — SIGNIFICANT CHANGE UP
HCT VFR BLD CALC: 41.8 % — SIGNIFICANT CHANGE UP (ref 34.5–45)
HGB BLD-MCNC: 14.1 G/DL — SIGNIFICANT CHANGE UP (ref 11.5–15.5)
IMM GRANULOCYTES NFR BLD AUTO: 0.4 % — SIGNIFICANT CHANGE UP (ref 0–0.9)
INR BLD: 0.99 RATIO — SIGNIFICANT CHANGE UP (ref 0.85–1.18)
KETONES UR-MCNC: NEGATIVE MG/DL — SIGNIFICANT CHANGE UP
LEUKOCYTE ESTERASE UR-ACNC: NEGATIVE — SIGNIFICANT CHANGE UP
LYMPHOCYTES # BLD AUTO: 2.82 K/UL — SIGNIFICANT CHANGE UP (ref 1–3.3)
LYMPHOCYTES # BLD AUTO: 27.9 % — SIGNIFICANT CHANGE UP (ref 13–44)
MCHC RBC-ENTMCNC: 30.4 PG — SIGNIFICANT CHANGE UP (ref 27–34)
MCHC RBC-ENTMCNC: 33.7 GM/DL — SIGNIFICANT CHANGE UP (ref 32–36)
MCV RBC AUTO: 90.1 FL — SIGNIFICANT CHANGE UP (ref 80–100)
MONOCYTES # BLD AUTO: 0.65 K/UL — SIGNIFICANT CHANGE UP (ref 0–0.9)
MONOCYTES NFR BLD AUTO: 6.4 % — SIGNIFICANT CHANGE UP (ref 2–14)
NEUTROPHILS # BLD AUTO: 6.42 K/UL — SIGNIFICANT CHANGE UP (ref 1.8–7.4)
NEUTROPHILS NFR BLD AUTO: 63.6 % — SIGNIFICANT CHANGE UP (ref 43–77)
NITRITE UR-MCNC: NEGATIVE — SIGNIFICANT CHANGE UP
PH UR: 7 — SIGNIFICANT CHANGE UP (ref 5–8)
PLATELET # BLD AUTO: 350 K/UL — SIGNIFICANT CHANGE UP (ref 150–400)
POTASSIUM SERPL-MCNC: 3.7 MMOL/L — SIGNIFICANT CHANGE UP (ref 3.5–5.3)
POTASSIUM SERPL-SCNC: 3.7 MMOL/L — SIGNIFICANT CHANGE UP (ref 3.5–5.3)
PROT UR-MCNC: NEGATIVE MG/DL — SIGNIFICANT CHANGE UP
PROTHROM AB SERPL-ACNC: 11.2 SEC — SIGNIFICANT CHANGE UP (ref 9.5–13)
RBC # BLD: 4.64 M/UL — SIGNIFICANT CHANGE UP (ref 3.8–5.2)
RBC # FLD: 13.5 % — SIGNIFICANT CHANGE UP (ref 10.3–14.5)
SODIUM SERPL-SCNC: 137 MMOL/L — SIGNIFICANT CHANGE UP (ref 135–145)
SP GR SPEC: 1.01 — SIGNIFICANT CHANGE UP (ref 1–1.03)
UROBILINOGEN FLD QL: 0.2 MG/DL — SIGNIFICANT CHANGE UP (ref 0.2–1)
WBC # BLD: 10.1 K/UL — SIGNIFICANT CHANGE UP (ref 3.8–10.5)
WBC # FLD AUTO: 10.1 K/UL — SIGNIFICANT CHANGE UP (ref 3.8–10.5)

## 2024-02-14 PROCEDURE — 71046 X-RAY EXAM CHEST 2 VIEWS: CPT | Mod: 26

## 2024-02-14 PROCEDURE — 85025 COMPLETE CBC W/AUTO DIFF WBC: CPT

## 2024-02-14 PROCEDURE — 86900 BLOOD TYPING SEROLOGIC ABO: CPT

## 2024-02-14 PROCEDURE — 85730 THROMBOPLASTIN TIME PARTIAL: CPT

## 2024-02-14 PROCEDURE — 71046 X-RAY EXAM CHEST 2 VIEWS: CPT

## 2024-02-14 PROCEDURE — 85610 PROTHROMBIN TIME: CPT

## 2024-02-14 PROCEDURE — 81003 URINALYSIS AUTO W/O SCOPE: CPT

## 2024-02-14 PROCEDURE — 93010 ELECTROCARDIOGRAM REPORT: CPT

## 2024-02-14 PROCEDURE — 86901 BLOOD TYPING SEROLOGIC RH(D): CPT

## 2024-02-14 PROCEDURE — 99214 OFFICE O/P EST MOD 30 MIN: CPT | Mod: 25

## 2024-02-14 PROCEDURE — 36415 COLL VENOUS BLD VENIPUNCTURE: CPT

## 2024-02-14 PROCEDURE — 86850 RBC ANTIBODY SCREEN: CPT

## 2024-02-14 PROCEDURE — 93005 ELECTROCARDIOGRAM TRACING: CPT

## 2024-02-14 PROCEDURE — 83036 HEMOGLOBIN GLYCOSYLATED A1C: CPT

## 2024-02-14 PROCEDURE — 80048 BASIC METABOLIC PNL TOTAL CA: CPT

## 2024-02-14 NOTE — H&P PST ADULT - BSA (M2)
2020    TELEHEALTH EVALUATION -- Audio/Visual (During DABCJ-43 public health emergency)    HPI:    Mitchell Montoya (:  [facility] [id number]       [address] [place] [address], [address] [place] [address] authority and the [employer] 1.77

## 2024-02-14 NOTE — H&P PST ADULT - ASSESSMENT
Plan:  1. PST instructions given ; NPO status/  instructions to be given by ASU   2. Pt instructed to take following meds on day of surgery:   3. Pt instructed to take routine evening medications unless indicated   4. Stop NSAIDS ( Aspirin Alev Motrin Mobic Diclofenac), herbal supplements , MVI , Vitamin fish oil 7 days prior to surgery  unless   directed by surgeon or cardiologist;   5. Medical Optimization  with    6. EZ wash instructions given & mupirocin instructions given  7. Labs EKG CXR as per surgeon request   8. Pt denies covid symptoms shortness of breath fever cough      48 year old female with hx of breast cancer s/p bilateral mastectomy s/p breast implants; Pt said she developed capsular contracture and multiple infections on left breast; c/o breast pain; pt takes Cefpodoxmine for infection prophylaxis; no erythema noted on chest area; she presents to PST for planned bilateral revision of reconstructed breast removal of breast implant capsulectomy axillary liposuction and flat closure        Plan:  1. PST instructions given ; NPO status/  instructions to be given by ASU   2. Pt instructed to take following meds on day of surgery: Tresiba 17 units synthroid Wellbutrin   3. Pt instructed to take routine evening medications unless indicated   4. Stop NSAIDS ( Aspirin Alev Motrin Mobic Diclofenac), herbal supplements , MVI , Vitamin fish oil 7 days prior to surgery  unless   directed by surgeon or cardiologist;   5. Medical Optimization  with Np [place] and Endo Dr Jimenez   6. EZ wash instructions given   7. Labs EKG CXR as per surgeon request   8. Pt denies covid symptoms shortness of breath fever cough

## 2024-02-14 NOTE — H&P PST ADULT - NSICDXPASTSURGICALHX_GEN_ALL_CORE_FT
PAST SURGICAL HISTORY:  H/O bilateral mastectomy     H/O breast implant     H/O breast reconstruction     H/O:      S/P hysterectomy with oophorectomy

## 2024-02-14 NOTE — H&P PST ADULT - NSANTHOSAYNRD_GEN_A_CORE
No. LY screening performed.  STOP BANG Legend: 0-2 = LOW Risk; 3-4 = INTERMEDIATE Risk; 5-8 = HIGH Risk

## 2024-02-14 NOTE — H&P PST ADULT - NSICDXPASTMEDICALHX_GEN_ALL_CORE_FT
PAST MEDICAL HISTORY:  Anxiety     Breast cancer, right     Breast implant capsular contracture     Hashimoto's disease     History of chemotherapy     HLD (hyperlipidemia)     HTN (hypertension)     Hypothyroidism     Infection of left breast     Osteopenia     Type 1 diabetes      PAST MEDICAL HISTORY:  Anxiety and depression     Breast cancer, right     Breast implant capsular contracture     Hashimoto's disease     History of chemotherapy     HLD (hyperlipidemia)     HTN (hypertension)     Hyperparathyroidism     Hypothyroidism     Infection of left breast     Osteopenia     Type 1 diabetes

## 2024-02-14 NOTE — H&P PST ADULT - HISTORY OF PRESENT ILLNESS
48 year old female with hx of breast cancer s/p bilateral mastectomy s/p breast implants; Pt said she developed capsular contracture and multiple infections on left breast; c/o breast pain; pt takes Cefpodoxmine for infection prophylaxis; no erythema noted on chest area; she presents to PST for planned bilateral revision of reconstructed breast removal of breast implant capsulectomy axillary liposuction and flat closure

## 2024-02-14 NOTE — H&P PST ADULT - NSICDXFAMILYHX_GEN_ALL_CORE_FT
FAMILY HISTORY:  Father  Still living? Unknown  FH: hypertension, Age at diagnosis: Age Unknown    Mother  Still living? Unknown  FH: uterine cancer, Age at diagnosis: Age Unknown

## 2024-02-15 ENCOUNTER — APPOINTMENT (OUTPATIENT)
Dept: FAMILY MEDICINE | Facility: CLINIC | Age: 49
End: 2024-02-15
Payer: COMMERCIAL

## 2024-02-15 VITALS
TEMPERATURE: 98.7 F | RESPIRATION RATE: 15 BRPM | HEART RATE: 99 BPM | HEIGHT: 64 IN | WEIGHT: 159 LBS | SYSTOLIC BLOOD PRESSURE: 128 MMHG | OXYGEN SATURATION: 99 % | DIASTOLIC BLOOD PRESSURE: 80 MMHG | BODY MASS INDEX: 27.14 KG/M2

## 2024-02-15 DIAGNOSIS — Z01.818 ENCOUNTER FOR OTHER PREPROCEDURAL EXAMINATION: ICD-10-CM

## 2024-02-15 LAB
A1C WITH ESTIMATED AVERAGE GLUCOSE RESULT: 7 % — HIGH (ref 4–5.6)
ESTIMATED AVERAGE GLUCOSE: 154 MG/DL — HIGH (ref 68–114)

## 2024-02-15 PROCEDURE — 99214 OFFICE O/P EST MOD 30 MIN: CPT

## 2024-02-15 RX ORDER — BUPROPION HYDROCHLORIDE 150 MG/1
150 TABLET, EXTENDED RELEASE ORAL
Qty: 90 | Refills: 1 | Status: ACTIVE | COMMUNITY
Start: 2023-12-04 | End: 1900-01-01

## 2024-02-15 RX ORDER — EXEMESTANE 25 MG/1
25 TABLET, FILM COATED ORAL DAILY
Qty: 60 | Refills: 0 | Status: COMPLETED | COMMUNITY
Start: 2019-11-25 | End: 2024-02-15

## 2024-02-15 NOTE — RESULTS/DATA
[] : not indicated [de-identified] : NL [de-identified] : NL [de-identified] : NL [de-identified] : NSR

## 2024-02-15 NOTE — PLAN
[FreeTextEntry1] : Pre operative  she is cleared for procedure She will RTO as needed   Anxiety/depression she is currently taking 150mg of welbutrin, feels this dose works better for her

## 2024-02-15 NOTE — HISTORY OF PRESENT ILLNESS
[No Pertinent Cardiac History] : no history of aortic stenosis, atrial fibrillation, coronary artery disease, recent myocardial infarction, or implantable device/pacemaker [No Pertinent Pulmonary History] : no history of asthma, COPD, sleep apnea, or smoking [No Adverse Anesthesia Reaction] : no adverse anesthesia reaction in self or family member [Diabetes] : diabetes [(Patient denies any chest pain, claudication, dyspnea on exertion, orthopnea, palpitations or syncope)] : Patient denies any chest pain, claudication, dyspnea on exertion, orthopnea, palpitations or syncope [Excellent (>10 METs)] : Excellent (>10 METs) [Aortic Stenosis] : no aortic stenosis [Atrial Fibrillation] : no atrial fibrillation [Recent Myocardial Infarction] : no recent myocardial infarction [Implantable Device/Pacemaker] : no implantable device/pacemaker [Asthma] : no asthma [COPD] : no COPD [Sleep Apnea] : no sleep apnea [Smoker] : not a smoker [Family Member] : no family member with adverse anesthesia reaction/sudden death [Self] : no previous adverse anesthesia reaction [Chronic Anticoagulation] : no chronic anticoagulation [Chronic Kidney Disease] : no chronic kidney disease [FreeTextEntry1] : Breast implant removal  [FreeTextEntry2] : 02/28 [FreeTextEntry3] : Dr. Luis Card  [FreeTextEntry4] : Presenting in office for pre operative exam, she is doing well has no complaints

## 2024-02-21 ENCOUNTER — TRANSCRIPTION ENCOUNTER (OUTPATIENT)
Age: 49
End: 2024-02-21

## 2024-02-21 ENCOUNTER — NON-APPOINTMENT (OUTPATIENT)
Age: 49
End: 2024-02-21

## 2024-02-22 RX ORDER — OXYCODONE 5 MG/1
5 TABLET ORAL
Qty: 10 | Refills: 0 | Status: ACTIVE | COMMUNITY
Start: 2024-02-22 | End: 1900-01-01

## 2024-02-23 PROBLEM — E78.5 HYPERLIPIDEMIA, UNSPECIFIED: Chronic | Status: ACTIVE | Noted: 2024-02-14

## 2024-02-23 PROBLEM — I10 ESSENTIAL (PRIMARY) HYPERTENSION: Chronic | Status: ACTIVE | Noted: 2024-02-14

## 2024-02-23 PROBLEM — N61.0 MASTITIS WITHOUT ABSCESS: Chronic | Status: ACTIVE | Noted: 2024-02-14

## 2024-02-23 PROBLEM — F41.9 ANXIETY DISORDER, UNSPECIFIED: Chronic | Status: ACTIVE | Noted: 2024-02-14

## 2024-02-23 PROBLEM — E21.3 HYPERPARATHYROIDISM, UNSPECIFIED: Chronic | Status: ACTIVE | Noted: 2024-02-14

## 2024-02-23 PROBLEM — Z92.21 PERSONAL HISTORY OF ANTINEOPLASTIC CHEMOTHERAPY: Chronic | Status: ACTIVE | Noted: 2024-02-14

## 2024-02-23 PROBLEM — E03.9 HYPOTHYROIDISM, UNSPECIFIED: Chronic | Status: ACTIVE | Noted: 2024-02-14

## 2024-02-23 PROBLEM — M85.80 OTHER SPECIFIED DISORDERS OF BONE DENSITY AND STRUCTURE, UNSPECIFIED SITE: Chronic | Status: ACTIVE | Noted: 2024-02-14

## 2024-02-23 PROBLEM — E10.9 TYPE 1 DIABETES MELLITUS WITHOUT COMPLICATIONS: Chronic | Status: ACTIVE | Noted: 2024-02-14

## 2024-02-23 PROBLEM — C50.911 MALIGNANT NEOPLASM OF UNSPECIFIED SITE OF RIGHT FEMALE BREAST: Chronic | Status: ACTIVE | Noted: 2024-02-14

## 2024-02-23 PROBLEM — T85.44XA CAPSULAR CONTRACTURE OF BREAST IMPLANT, INITIAL ENCOUNTER: Chronic | Status: ACTIVE | Noted: 2024-02-14

## 2024-02-23 PROBLEM — E06.3 AUTOIMMUNE THYROIDITIS: Chronic | Status: ACTIVE | Noted: 2024-02-14

## 2024-02-26 ENCOUNTER — TRANSCRIPTION ENCOUNTER (OUTPATIENT)
Age: 49
End: 2024-02-26

## 2024-02-27 ENCOUNTER — TRANSCRIPTION ENCOUNTER (OUTPATIENT)
Age: 49
End: 2024-02-27

## 2024-02-28 ENCOUNTER — TRANSCRIPTION ENCOUNTER (OUTPATIENT)
Age: 49
End: 2024-02-28

## 2024-02-28 ENCOUNTER — OUTPATIENT (OUTPATIENT)
Dept: INPATIENT UNIT | Facility: HOSPITAL | Age: 49
LOS: 1 days | Discharge: HOME | End: 2024-02-28
Payer: COMMERCIAL

## 2024-02-28 ENCOUNTER — RESULT REVIEW (OUTPATIENT)
Age: 49
End: 2024-02-28

## 2024-02-28 ENCOUNTER — APPOINTMENT (OUTPATIENT)
Dept: PLASTIC SURGERY | Facility: HOSPITAL | Age: 49
End: 2024-02-28
Payer: COMMERCIAL

## 2024-02-28 VITALS
WEIGHT: 158.95 LBS | HEART RATE: 90 BPM | HEIGHT: 64 IN | RESPIRATION RATE: 16 BRPM | OXYGEN SATURATION: 100 % | SYSTOLIC BLOOD PRESSURE: 133 MMHG | DIASTOLIC BLOOD PRESSURE: 77 MMHG | TEMPERATURE: 98 F

## 2024-02-28 VITALS
HEART RATE: 76 BPM | TEMPERATURE: 98 F | RESPIRATION RATE: 18 BRPM | DIASTOLIC BLOOD PRESSURE: 77 MMHG | SYSTOLIC BLOOD PRESSURE: 120 MMHG | OXYGEN SATURATION: 99 %

## 2024-02-28 DIAGNOSIS — E78.5 HYPERLIPIDEMIA, UNSPECIFIED: ICD-10-CM

## 2024-02-28 DIAGNOSIS — F41.9 ANXIETY DISORDER, UNSPECIFIED: ICD-10-CM

## 2024-02-28 DIAGNOSIS — Z88.2 ALLERGY STATUS TO SULFONAMIDES: ICD-10-CM

## 2024-02-28 DIAGNOSIS — E10.9 TYPE 1 DIABETES MELLITUS WITHOUT COMPLICATIONS: ICD-10-CM

## 2024-02-28 DIAGNOSIS — I10 ESSENTIAL (PRIMARY) HYPERTENSION: ICD-10-CM

## 2024-02-28 DIAGNOSIS — E03.9 HYPOTHYROIDISM, UNSPECIFIED: ICD-10-CM

## 2024-02-28 DIAGNOSIS — E06.3 AUTOIMMUNE THYROIDITIS: ICD-10-CM

## 2024-02-28 DIAGNOSIS — Z92.21 PERSONAL HISTORY OF ANTINEOPLASTIC CHEMOTHERAPY: ICD-10-CM

## 2024-02-28 DIAGNOSIS — Z85.3 PERSONAL HISTORY OF MALIGNANT NEOPLASM OF BREAST: ICD-10-CM

## 2024-02-28 DIAGNOSIS — Z90.13 ACQUIRED ABSENCE OF BILATERAL BREASTS AND NIPPLES: ICD-10-CM

## 2024-02-28 DIAGNOSIS — Z98.891 HISTORY OF UTERINE SCAR FROM PREVIOUS SURGERY: Chronic | ICD-10-CM
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
## 2024-02-28 DIAGNOSIS — Z79.4 LONG TERM (CURRENT) USE OF INSULIN: ICD-10-CM

## 2024-02-28 DIAGNOSIS — N65.0 DEFORMITY OF RECONSTRUCTED BREAST: ICD-10-CM

## 2024-02-28 DIAGNOSIS — Z87.891 PERSONAL HISTORY OF NICOTINE DEPENDENCE: ICD-10-CM

## 2024-02-28 DIAGNOSIS — T85.44XA CAPSULAR CONTRACTURE OF BREAST IMPLANT, INITIAL ENCOUNTER: ICD-10-CM

## 2024-02-28 DIAGNOSIS — Z90.710 ACQUIRED ABSENCE OF BOTH CERVIX AND UTERUS: Chronic | ICD-10-CM

## 2024-02-28 DIAGNOSIS — Z90.13 ACQUIRED ABSENCE OF BILATERAL BREASTS AND NIPPLES: Chronic | ICD-10-CM

## 2024-02-28 DIAGNOSIS — F32.9 MAJOR DEPRESSIVE DISORDER, SINGLE EPISODE, UNSPECIFIED: ICD-10-CM

## 2024-02-28 DIAGNOSIS — Z79.85 LONG-TERM (CURRENT) USE OF INJECTABLE NON-INSULIN ANTIDIABETIC DRUGS: ICD-10-CM

## 2024-02-28 DIAGNOSIS — Z98.82 BREAST IMPLANT STATUS: Chronic | ICD-10-CM

## 2024-02-28 DIAGNOSIS — Z98.890 OTHER SPECIFIED POSTPROCEDURAL STATES: Chronic | ICD-10-CM

## 2024-02-28 DIAGNOSIS — E21.3 HYPERPARATHYROIDISM, UNSPECIFIED: ICD-10-CM

## 2024-02-28 DIAGNOSIS — N61.0 MASTITIS WITHOUT ABSCESS: ICD-10-CM

## 2024-02-28 LAB — GLUCOSE BLDC GLUCOMTR-MCNC: 94 MG/DL — SIGNIFICANT CHANGE UP (ref 70–99)

## 2024-02-28 PROCEDURE — 15877 SUCTION LIPECTOMY TRUNK: CPT | Mod: 59

## 2024-02-28 PROCEDURE — 88300 SURGICAL PATH GROSS: CPT | Mod: 26,59

## 2024-02-28 PROCEDURE — 82962 GLUCOSE BLOOD TEST: CPT

## 2024-02-28 PROCEDURE — 88304 TISSUE EXAM BY PATHOLOGIST: CPT | Mod: 26

## 2024-02-28 PROCEDURE — 88304 TISSUE EXAM BY PATHOLOGIST: CPT

## 2024-02-28 PROCEDURE — 88300 SURGICAL PATH GROSS: CPT

## 2024-02-28 PROCEDURE — 19380 REVJ RECONSTRUCTED BREAST: CPT | Mod: 50

## 2024-02-28 PROCEDURE — C9399: CPT

## 2024-02-28 RX ORDER — BUPROPION HYDROCHLORIDE 150 MG/1
1 TABLET, EXTENDED RELEASE ORAL
Refills: 0 | DISCHARGE

## 2024-02-28 RX ORDER — FENTANYL CITRATE 50 UG/ML
25 INJECTION INTRAVENOUS
Refills: 0 | Status: DISCONTINUED | OUTPATIENT
Start: 2024-02-28 | End: 2024-02-28

## 2024-02-28 RX ORDER — LEVOTHYROXINE SODIUM 125 MCG
1 TABLET ORAL
Refills: 0 | DISCHARGE

## 2024-02-28 RX ORDER — ONDANSETRON 8 MG/1
4 TABLET, FILM COATED ORAL ONCE
Refills: 0 | Status: COMPLETED | OUTPATIENT
Start: 2024-02-28 | End: 2024-02-28

## 2024-02-28 RX ORDER — INSULIN LISPRO 100/ML
0 VIAL (ML) SUBCUTANEOUS
Refills: 0 | DISCHARGE

## 2024-02-28 RX ORDER — CEFPODOXIME PROXETIL 100 MG
1 TABLET ORAL
Refills: 0 | DISCHARGE

## 2024-02-28 RX ORDER — FENTANYL CITRATE 50 UG/ML
50 INJECTION INTRAVENOUS
Refills: 0 | Status: DISCONTINUED | OUTPATIENT
Start: 2024-02-28 | End: 2024-02-28

## 2024-02-28 RX ORDER — INSULIN DEGLUDEC 100 U/ML
0 INJECTION, SOLUTION SUBCUTANEOUS
Refills: 0 | DISCHARGE

## 2024-02-28 RX ORDER — SODIUM CHLORIDE 9 MG/ML
1000 INJECTION, SOLUTION INTRAVENOUS
Refills: 0 | Status: DISCONTINUED | OUTPATIENT
Start: 2024-02-28 | End: 2024-02-28

## 2024-02-28 RX ORDER — IBUPROFEN 200 MG
400 TABLET ORAL ONCE
Refills: 0 | Status: COMPLETED | OUTPATIENT
Start: 2024-02-28 | End: 2024-02-28

## 2024-02-28 RX ADMIN — ONDANSETRON 4 MILLIGRAM(S): 8 TABLET, FILM COATED ORAL at 15:30

## 2024-02-28 RX ADMIN — Medication 400 MILLIGRAM(S): at 17:35

## 2024-02-28 NOTE — BRIEF OPERATIVE NOTE - NSICDXBRIEFPREOP_GEN_ALL_CORE_FT
PRE-OP DIAGNOSIS:  Breast implant capsular contracture 28-Feb-2024 10:58:40  Alanna Reed   PRE-OP DIAGNOSIS:  Breast implant capsular contracture 28-Feb-2024 10:58:40  Alanna Reed  Personal history of breast cancer 28-Feb-2024 14:30:22  Alanna Reed

## 2024-02-28 NOTE — ASU PATIENT PROFILE, ADULT - NSICDXPASTMEDICALHX_GEN_ALL_CORE_FT
PAST MEDICAL HISTORY:  Anxiety and depression     Breast cancer, right     Breast implant capsular contracture     Hashimoto's disease     History of chemotherapy     HLD (hyperlipidemia)     HTN (hypertension)     Hyperparathyroidism     Hypothyroidism     Infection of left breast     Osteopenia     Type 1 diabetes

## 2024-02-28 NOTE — ASU DISCHARGE PLAN (ADULT/PEDIATRIC) - CARE PROVIDER_API CALL
Luis Card  Plastic Surgery  [address] [place] [id number]  Phone: [phone]  Fax: [phone]  Follow Up Time:

## 2024-02-28 NOTE — ASU DISCHARGE PLAN (ADULT/PEDIATRIC) - DRAIN CARE: EMPTY THE DRAIN TWICE A DAY. RECORD THE AMOUNT AND COLOR.
Patient:   JOHN FAIRBANKS            MRN: [id number]            FIN: [id number]               Age:   14 years     Sex:  FEMALE     :  [birth year]   Associated Diagnoses:   None   Author:   JAGDEEP WAGNER      OPERATION DATE: 10/06/17 11:05     PREOPERATIVE DIAGNOSIS:  Juvenile cataract OS.    POSTOPERATIVE DIAGNOSIS:  Same.    SURGEON:  Jagdeep Forbes MD    PROCEDURE PERFORMED:  Cataract extraction, posterior chamber intraocular lens implantation OS.    ANESTHESIA:  General anesthesia.    COMPLICATIONS:  There were no operative complications.    Prior to the OR, the patient's left eye was dilated with combination of 1% Cyclogyl and 2.5% Deniz-Synephrine eye drops.  The patient was brought into the operating room and placed under general anesthesia without difficulty.  The eye was prepped and draped in the usual sterile fashion for intraocular surgery.  Inspection revealed a well-dilated pupil, and a diffuse type cataract.    A Supersharp blade was used to make a partial-thickness corneal wound extending from 11 towards 9 o'clock along the far peripheral clear cornea.  A keratome blade was used to complete a tribevel incision into the anterior chamber.  The anterior chamber was filled with a air bubble.  Trypan blue was injected into the anterior chamber to stain the anterior capsule.  Trypan blue was washed out of the eye with BSS.  Viscoelastic material was injected into the anterior chamber.  An Utrata forceps was used to create a central circular anterior capsulotomy with a 6 mm diameter.  The Synerchip manual irrigation-aspiration instrument was introduced through the corneal wound and directed into the lens.  The lens was hydrated.  Using a manual technique, the nucleus was easily aspirated.  The tip of the cannula was passed to towards the lens equator and used to remove lens periphery.  In this fashion intraocular lens content was entirely removed.  The corneal wound was opened to its full  width with the keratome blade.  Viscoelastic material was used to inflate the capsular bag and anterior chamber.  An intraocular lens was prepared.  Namely, an Marco Antonio model [id number] with a power of +16.0 D for a post-operative correction of -0.4 D.  The  tip was directed through the corneal wound and into the capsular bag.  The lens was injected with the leading haptic directed into the capsular bag.  The trailing haptic was dialed into position with a Kuglen hook.  The lens was dialed to a horizontal position.  The lens was seen to be central and captured within the capsular bag.  Viscoelastic was removed from the anterior chamber.  Miochol was injected into the anterior chamber and the pupil was seen to constrict round and centrally.  The corneal wound was then closed with 2 interrupted 10-0 Vicryl sutures. The long arms of the suture were cut short and the knots rotated and buried.  The corneal wound was checked and found to be watertight.  Fifty milligrams of Ancef and 2 mg of dexamethasone were then injected subconjunctivally into the inferior fornix.    The operating microscope was pushed aside.  Speculum was removed.  Drapes were removed.  Field was cleaned.  TobraDex eye ointment was applied to the eye. Pressure patch was applied to the eye.  The patient was reversed from anesthesia and transported to the recovery room in good condition.            Electronically Signed On 10/06/2017 11:06  __________________________________________________   JOSUE WAGNER     Statement Selected

## 2024-02-28 NOTE — ASU DISCHARGE PLAN (ADULT/PEDIATRIC) - PROCEDURE
Bilateral revision of reconstructed breasts, bilateral breast implant removal, capsulectomy, muscle repair with flat closure Bilateral revision of reconstructed breasts, bilateral breast implant removal, capsulectomy, axillary liposuction with flat closure

## 2024-02-28 NOTE — ASU DISCHARGE PLAN (ADULT/PEDIATRIC) - ASU DC SPECIAL INSTRUCTIONSFT
POST OPERATIVE INSTRUCTIONS    Dressings:   * You will have dressings over your surgical incisions. Please leave these dressings in place and dry for 48 hours (about 2 days). You may sponge bathe only, making sure to keep the surgical areas dry.      * AFTER 48 hours, you may remove the outer dressings only, leaving any steri strips, surgical tape and adhesive in place. After the outer dressings are removed, you may shower. Do not scrub directly on the incisions, and pat everything dry when you are finished. You do not need to replace the outer gauze after showering, only the steri strips or surgical tape.      Drains: (If your surgery does not require drains, you may skip this section)     * Please keep track of the output of your drains and write the values down. To empty the drains, open the cap from the top of the bulb to take it off suction, record how much is in the bulb based off the measuring values on the side of the drain bulb, and empty the contents into the cup that is provided to you.      * Make sure to keep track of the measurements for the right and left drains separately, if you have more than one drain.     * Measure the output twice a day. Make sure to document the time, date, and amount for each drain     * Bring your drain output log with you to your first post-operative appointment! This will gauge if the drains will be ready to be removed or not.      ***Once you are done emptying the drain bulbs, it is very important that you put the drains back on suction. To do this, squeeze the bulb prior to putting the cap back on. The bulb should remain on suction and should appear flat/compressed at ALL times.      Medications:     * Prior to your surgery day, all medications will be sent to your pharmacy and should be picked up.      * For the first 48 hours following the procedure, please alternate taking 1000mg Tylenol  mg of Ibuprofen every 4 hours (as long as you do not have any contraindications for taking either one).   EXAMPLE:  8 AM- 1000 mg Tylenol   12 PM- 400 mg Ibuprofen   4 PM- 1000 mg Tylenol   8 PM - 400 mg Ibuprofen   12 AM - 1000mg Tylenol     * If you are still having pain after following the above regimen, you can take the prescribed Oxycodone pain medication. Please take the prescription as prescribed every 6 hours as needed.      * After 48 hours following the procedure, take the Tylenol and Ibuprofen as needed.       Activity:     * Following the procedure, you can walk for exercise, being mindful not to raise your heart rate too high which may cause bleeding. Walking is strongly encouraged, especially during the first 3 weeks.      * Please avoid vigorous exercising, lifting more than 5-10 lbs, overstretching the upper body, excessive bending.      * Try to avoid sleeping on your sides, or directly on your stomach for the first 4 weeks following the surgery. After about a month of recovery, you may return to side sleeping.     CALL THE OFFICE ([phone]) IMMEDIATELY IF YOU EXPERIENCE ANY OF THE FOLLOWING:     A high fever, (over 101º) severe nausea and vomiting, continued dizziness or incoherent behavior, such as hallucinations.     Any pain that cannot be controlled by your pain medication.     Bright red skin that is hot to the touch.     Excessive bleeding or fluid seeping through the incisions.     A severely misshapen breast or bruising that is localized to one breast or region of the chest.     You will only be released to the care of a responsible adult. All of these instructions must be clear to the adult who will monitor your health and support you, around the clock in the first 24 hours after surgery.     NO SMOKING OR ALCOHOL CONSUMPTION     Follow Up: The office will call you to schedule your post-operative office visit, which should take place about 1 week after surgery POST OPERATIVE INSTRUCTIONS    Dressings:   * You will have dressings over your surgical incisions. Please leave these dressings in place and dry for 48 hours (about 2 days). You may sponge bathe only, making sure to keep the surgical areas dry.      * AFTER 48 hours, you may remove the outer dressings only, leaving any steri strips, surgical tape and adhesive in place. After the outer dressings are removed, you may shower. Do not scrub directly on the incisions, and pat everything dry when you are finished. You do not need to replace the outer gauze after showering, only the steri strips or surgical tape.  Please wear binder 24/7 to help decrease swelling.    Drains:    * Please keep track of the output of your drains and write the values down. To empty the drains, open the cap from the top of the bulb to take it off suction, record how much is in the bulb based off the measuring values on the side of the drain bulb, and empty the contents into the cup that is provided to you.      * Make sure to keep track of the measurements for the right and left drains separately, if you have more than one drain.     * Measure the output twice a day. Make sure to document the time, date, and amount for each drain     * Bring your drain output log with you to your first post-operative appointment! This will gauge if the drains will be ready to be removed or not.      ***Once you are done emptying the drain bulbs, it is very important that you put the drains back on suction. To do this, squeeze the bulb prior to putting the cap back on. The bulb should remain on suction and should appear flat/compressed at ALL times.      Medications:     * Prior to your surgery day, all medications will be sent to your pharmacy and should be picked up.      * For the first 48 hours following the procedure, please alternate taking 1000mg Tylenol  mg of Ibuprofen every 4 hours (as long as you do not have any contraindications for taking either one).   EXAMPLE:  8 AM- 1000 mg Tylenol   12 PM- 400 mg Ibuprofen   4 PM- 1000 mg Tylenol   8 PM - 400 mg Ibuprofen   12 AM - 1000mg Tylenol     * If you are still having pain after following the above regimen, you can take the prescribed Oxycodone pain medication. Please take the prescription as prescribed every 6 hours as needed.      * After 48 hours following the procedure, take the Tylenol and Ibuprofen as needed.     * Please take antibiotic until completed      Activity:     * Following the procedure, you can walk for exercise, being mindful not to raise your heart rate too high which may cause bleeding. Walking is strongly encouraged, especially during the first 3 weeks.      * Please avoid vigorous exercising, lifting more than 5-10 lbs, overstretching the upper body, excessive bending.      * Try to avoid sleeping on your sides, or directly on your stomach for the first 4 weeks following the surgery. After about a month of recovery, you may return to side sleeping.     CALL THE OFFICE ([phone]) IMMEDIATELY IF YOU EXPERIENCE ANY OF THE FOLLOWING:     A high fever, (over 101º) severe nausea and vomiting, continued dizziness or incoherent behavior, such as hallucinations.     Any pain that cannot be controlled by your pain medication.     Bright red skin that is hot to the touch.     Excessive bleeding or fluid seeping through the incisions.     A severely misshapen breast or bruising that is localized to one breast or region of the chest.     You will only be released to the care of a responsible adult. All of these instructions must be clear to the adult who will monitor your health and support you, around the clock in the first 24 hours after surgery.     NO SMOKING OR ALCOHOL CONSUMPTION     Follow Up: The office will call you to schedule your post-operative office visit, which should take place about 1 week after surgery

## 2024-03-04 LAB — SURGICAL PATHOLOGY STUDY: SIGNIFICANT CHANGE UP

## 2024-03-06 ENCOUNTER — APPOINTMENT (OUTPATIENT)
Dept: PLASTIC SURGERY | Facility: CLINIC | Age: 49
End: 2024-03-06
Payer: COMMERCIAL

## 2024-03-06 VITALS
SYSTOLIC BLOOD PRESSURE: 137 MMHG | OXYGEN SATURATION: 97 % | DIASTOLIC BLOOD PRESSURE: 78 MMHG | WEIGHT: 159 LBS | HEART RATE: 94 BPM | HEIGHT: 64 IN | BODY MASS INDEX: 27.14 KG/M2 | TEMPERATURE: 97.8 F

## 2024-03-06 PROCEDURE — 99024 POSTOP FOLLOW-UP VISIT: CPT

## 2024-03-06 NOTE — ASSESSMENT
[FreeTextEntry1] : 48 yo female s/p b/l breast implant removal, capsulectomy, flat closure 2/28/24  drains removed today without difficulty monitor for redness, swelling, fever, chills no heavy lifting or strenuous activity continue to wear soft, non wire bra she is encouraged to call if there are any changes all pt questions answered

## 2024-03-06 NOTE — PHYSICAL EXAM
[NI] : Normal [de-identified] : b/l chest wall flat incisions c/d/i drains in place and functioning no palpable fluid collections or signs of infection

## 2024-03-06 NOTE — HISTORY OF PRESENT ILLNESS
[FreeTextEntry1] : Ms. FERNANDO FLOYD is a 49 year old female with past medical history of breast cancer which was treated with implant reconstruction who presents now s/p b/l breast implant removal, capsulectomy, flat closure 2/28/24.  Doing well, no complaints.  Drains < 30 cc  Denies fever, chills, LE pain/edema

## 2024-03-12 ENCOUNTER — NON-APPOINTMENT (OUTPATIENT)
Age: 49
End: 2024-03-12

## 2024-03-20 ENCOUNTER — APPOINTMENT (OUTPATIENT)
Dept: PLASTIC SURGERY | Facility: CLINIC | Age: 49
End: 2024-03-20
Payer: COMMERCIAL

## 2024-03-20 PROCEDURE — 99024 POSTOP FOLLOW-UP VISIT: CPT

## 2024-03-20 NOTE — PHYSICAL EXAM
[NI] : Normal [de-identified] : b/l chest wall flat incisions c/d/i no palpable fluid collections or signs of infection

## 2024-03-20 NOTE — HISTORY OF PRESENT ILLNESS
[FreeTextEntry1] : Ms. FERNANDO FLOYD is a 49 year old female with past medical history of breast cancer which was treated with implant reconstruction who presents now s/p b/l breast implant removal, capsulectomy, flat closure 2/28/24.  Doing well, no complaints.  presents for suture removal  Denies fever, chills, LE pain/edema

## 2024-03-20 NOTE — ASSESSMENT
[FreeTextEntry1] : 50 yo female s/p b/l implant removal and flat closure 2/28/24 prenio and sutures removed today without difficulty incisions well healed steri strips replaced monitor for redness, swelling, fever, chills continue to wear compression  she is encouraged to call if there are any changes all pt questions answered

## 2024-03-26 ENCOUNTER — RX RENEWAL (OUTPATIENT)
Age: 49
End: 2024-03-26

## 2024-03-26 RX ORDER — INSULIN LISPRO 100 [IU]/ML
100 INJECTION, SOLUTION INTRAVENOUS; SUBCUTANEOUS
Qty: 60 | Refills: 3 | Status: ACTIVE | COMMUNITY
Start: 2022-12-21 | End: 1900-01-01

## 2024-04-01 ENCOUNTER — TRANSCRIPTION ENCOUNTER (OUTPATIENT)
Age: 49
End: 2024-04-01

## 2024-04-01 ENCOUNTER — OUTPATIENT (OUTPATIENT)
Dept: OUTPATIENT SERVICES | Facility: HOSPITAL | Age: 49
LOS: 1 days | End: 2024-04-01

## 2024-04-01 ENCOUNTER — APPOINTMENT (OUTPATIENT)
Dept: NUCLEAR MEDICINE | Facility: CLINIC | Age: 49
End: 2024-04-01
Payer: COMMERCIAL

## 2024-04-01 ENCOUNTER — APPOINTMENT (OUTPATIENT)
Dept: ULTRASOUND IMAGING | Facility: CLINIC | Age: 49
End: 2024-04-01
Payer: COMMERCIAL

## 2024-04-01 DIAGNOSIS — Z98.82 BREAST IMPLANT STATUS: Chronic | ICD-10-CM

## 2024-04-01 DIAGNOSIS — Z98.890 OTHER SPECIFIED POSTPROCEDURAL STATES: Chronic | ICD-10-CM

## 2024-04-01 DIAGNOSIS — Z90.710 ACQUIRED ABSENCE OF BOTH CERVIX AND UTERUS: Chronic | ICD-10-CM

## 2024-04-01 DIAGNOSIS — Z98.891 HISTORY OF UTERINE SCAR FROM PREVIOUS SURGERY: Chronic | ICD-10-CM

## 2024-04-01 DIAGNOSIS — E21.0 PRIMARY HYPERPARATHYROIDISM: ICD-10-CM

## 2024-04-01 DIAGNOSIS — Z90.13 ACQUIRED ABSENCE OF BILATERAL BREASTS AND NIPPLES: Chronic | ICD-10-CM

## 2024-04-01 PROCEDURE — 78072 PARATHYRD PLANAR W/SPECT&CT: CPT | Mod: 26

## 2024-04-01 PROCEDURE — 76536 US EXAM OF HEAD AND NECK: CPT | Mod: 26

## 2024-04-03 ENCOUNTER — NON-APPOINTMENT (OUTPATIENT)
Age: 49
End: 2024-04-03

## 2024-04-09 LAB
HBA1C MFR BLD HPLC: 6.9
LDLC SERPL DIRECT ASSAY-MCNC: 107
MICROALBUMIN/CREAT 24H UR-RTO: <0.2

## 2024-04-10 ENCOUNTER — APPOINTMENT (OUTPATIENT)
Dept: ENDOCRINOLOGY | Facility: CLINIC | Age: 49
End: 2024-04-10
Payer: COMMERCIAL

## 2024-04-10 VITALS
HEART RATE: 97 BPM | RESPIRATION RATE: 16 BRPM | HEIGHT: 64 IN | DIASTOLIC BLOOD PRESSURE: 70 MMHG | SYSTOLIC BLOOD PRESSURE: 122 MMHG | OXYGEN SATURATION: 99 % | WEIGHT: 158 LBS | BODY MASS INDEX: 26.98 KG/M2

## 2024-04-10 DIAGNOSIS — E83.52 HYPERCALCEMIA: ICD-10-CM

## 2024-04-10 DIAGNOSIS — E10.65 TYPE 1 DIABETES MELLITUS WITH HYPERGLYCEMIA: ICD-10-CM

## 2024-04-10 DIAGNOSIS — E03.9 HYPOTHYROIDISM, UNSPECIFIED: ICD-10-CM

## 2024-04-10 LAB — TSH SERPL-ACNC: 2.32

## 2024-04-10 PROCEDURE — 99215 OFFICE O/P EST HI 40 MIN: CPT

## 2024-04-10 PROCEDURE — 95251 CONT GLUC MNTR ANALYSIS I&R: CPT

## 2024-04-10 NOTE — ASSESSMENT
[FreeTextEntry1] : 49 year old female with T1DM, hypothyroidism, hypertension, hyperlipidemia.   Bilateral mastectomy for breast ac with breast implants.  She stopped omnipod due to local skin reactions and now she takes inpen.   1. T1DM: just  recovered from  B/L breast implants removal .  cgm report revised and d/w pt: target Bgs      61% and high 21% and very high 14%          continue ICR lunch 1:6.5 and dinner 1:7 . Restart exercising.  continue the inpens .  continue tresiba 35 u HS.   Cont ARB Abnormal LFts : due to chronic ABx . Now getting better   This patient with diabetes performs 4 or more glucose checks per day utilizing a home blood glucose monitor. The patient is treated with insulin via 3 or more injections daily (or a subcutaneous insulin infusion pump.) This patient requires frequent adjustments to their insulin treatment on the basis of therapeutic continuous glucose monitoring results. In addition, the patient has been to our office for an evaluation of their diabetes control within the past 6 months.  she is optimized medically from endocrine perspective for upcoming breast surgery daryl day of the surgery take only half of the basal insulin . No meal insulin on the day of surgery until she eats again full meal.   2. Hypothyroid / Hashimoto's : Continue current dose of LT4.  3. Hyperlipidemia: lipids very good. Cont statin.   4. HTN:  bp at target on meds. Continue current management.  5. Hypercalcemia- serum calcium normal. normal now.  Followed by oncologist (breast cancer)  check PTHi, 1,25diOHD, 25 OHD , PTHrp, SPEP  tfts normal.  24 hr urine calcium normal. Localized studies negative both  She would like to monitor for now.   6 . vitamin d deficiency : continue MVI   7 . Osteopenia: secondary to chemoT and estrogen Rc blockers.

## 2024-04-17 ENCOUNTER — APPOINTMENT (OUTPATIENT)
Dept: PLASTIC SURGERY | Facility: CLINIC | Age: 49
End: 2024-04-17
Payer: COMMERCIAL

## 2024-04-17 VITALS
DIASTOLIC BLOOD PRESSURE: 82 MMHG | OXYGEN SATURATION: 100 % | HEART RATE: 99 BPM | SYSTOLIC BLOOD PRESSURE: 166 MMHG | TEMPERATURE: 97.7 F

## 2024-04-17 PROCEDURE — 99024 POSTOP FOLLOW-UP VISIT: CPT

## 2024-04-17 NOTE — PHYSICAL EXAM
[NI] : Normal [de-identified] : b/l chest wall flat incisions well healed  no palpable fluid collections or signs of infection there is excess skin and tissue medially causing bunching in the middle of pts chest there is excess skin and tissue laterally of right chest wall  chest wall otherwise flat

## 2024-04-17 NOTE — ASSESSMENT
[FreeTextEntry1] : 50 yo female s/p b/l breast implant removal, and flat closure 2/28/24 doing well no restrictions at this time discussed scar massages monitor for redness, swelling, fever, chills she is encouraged to call if there are any changes all pt questions answered She would like to discuss revision due to excess skin and tissue medially and laterally, will discuss with Dr. Card for plan  I discussed the risks, benefits, and alternatives including the risks of infection, bleeding, seroma, hematoma, asymmetry, nipple necrosis, change/loss of nipple sensation, contour irregularity, breast skin necrosis, delayed wound healing, need for more surgery.  The patient understands these risks, all questions were answered and the pt wishes to proceed with surgery.

## 2024-04-25 ENCOUNTER — APPOINTMENT (OUTPATIENT)
Dept: PLASTIC SURGERY | Facility: CLINIC | Age: 49
End: 2024-04-25
Payer: COMMERCIAL

## 2024-04-25 ENCOUNTER — TRANSCRIPTION ENCOUNTER (OUTPATIENT)
Age: 49
End: 2024-04-25

## 2024-04-25 PROCEDURE — 99214 OFFICE O/P EST MOD 30 MIN: CPT | Mod: 24

## 2024-05-20 ENCOUNTER — APPOINTMENT (OUTPATIENT)
Dept: FAMILY MEDICINE | Facility: CLINIC | Age: 49
End: 2024-05-20

## 2024-05-22 RX ORDER — OXYCODONE 5 MG/1
5 TABLET ORAL
Qty: 10 | Refills: 0 | Status: ACTIVE | COMMUNITY
Start: 2024-05-22 | End: 1900-01-01

## 2024-05-22 RX ORDER — CEFADROXIL 500 MG/1
500 CAPSULE ORAL TWICE DAILY
Qty: 6 | Refills: 0 | Status: ACTIVE | COMMUNITY
Start: 2024-05-22 | End: 1900-01-01

## 2024-05-23 ENCOUNTER — APPOINTMENT (OUTPATIENT)
Dept: FAMILY MEDICINE | Facility: CLINIC | Age: 49
End: 2024-05-23
Payer: COMMERCIAL

## 2024-05-23 VITALS
DIASTOLIC BLOOD PRESSURE: 60 MMHG | RESPIRATION RATE: 16 BRPM | OXYGEN SATURATION: 98 % | SYSTOLIC BLOOD PRESSURE: 116 MMHG | HEIGHT: 64 IN | TEMPERATURE: 98.2 F | WEIGHT: 158 LBS | BODY MASS INDEX: 26.98 KG/M2 | HEART RATE: 86 BPM

## 2024-05-23 DIAGNOSIS — I10 ESSENTIAL (PRIMARY) HYPERTENSION: ICD-10-CM

## 2024-05-23 DIAGNOSIS — Z01.818 ENCOUNTER FOR OTHER PREPROCEDURAL EXAMINATION: ICD-10-CM

## 2024-05-23 PROCEDURE — G2211 COMPLEX E/M VISIT ADD ON: CPT | Mod: NC,1L

## 2024-05-23 PROCEDURE — 99214 OFFICE O/P EST MOD 30 MIN: CPT

## 2024-05-23 RX ORDER — CEFADROXIL 500 MG/1
500 CAPSULE ORAL TWICE DAILY
Qty: 6 | Refills: 0 | Status: COMPLETED | COMMUNITY
Start: 2024-02-22 | End: 2024-05-23

## 2024-05-23 RX ORDER — VALSARTAN AND HYDROCHLOROTHIAZIDE 160; 25 MG/1; MG/1
160-25 TABLET, FILM COATED ORAL
Qty: 90 | Refills: 0 | Status: ACTIVE | COMMUNITY
Start: 2018-09-04 | End: 1900-01-01

## 2024-05-23 NOTE — RESULTS/DATA
[] : not indicated [de-identified] : NL [de-identified] : NL [de-identified] : NL [de-identified] : NSR

## 2024-05-23 NOTE — HISTORY OF PRESENT ILLNESS
[No Pertinent Cardiac History] : no history of aortic stenosis, atrial fibrillation, coronary artery disease, recent myocardial infarction, or implantable device/pacemaker [No Pertinent Pulmonary History] : no history of asthma, COPD, sleep apnea, or smoking [No Adverse Anesthesia Reaction] : no adverse anesthesia reaction in self or family member [Diabetes] : diabetes [(Patient denies any chest pain, claudication, dyspnea on exertion, orthopnea, palpitations or syncope)] : Patient denies any chest pain, claudication, dyspnea on exertion, orthopnea, palpitations or syncope [Excellent (>10 METs)] : Excellent (>10 METs) [Aortic Stenosis] : no aortic stenosis [Atrial Fibrillation] : no atrial fibrillation [Recent Myocardial Infarction] : no recent myocardial infarction [Implantable Device/Pacemaker] : no implantable device/pacemaker [Asthma] : no asthma [COPD] : no COPD [Sleep Apnea] : no sleep apnea [Smoker] : not a smoker [Family Member] : no family member with adverse anesthesia reaction/sudden death [Self] : no previous adverse anesthesia reaction [Chronic Anticoagulation] : no chronic anticoagulation [Chronic Kidney Disease] : no chronic kidney disease [FreeTextEntry1] : Recent breast surgery modification  [FreeTextEntry2] : 5/31/24 [FreeTextEntry3] : Dr. Luis Card  [FreeTextEntry4] : Presenting in office for pre operative exam for revision of recent breast reduction.

## 2024-05-29 ENCOUNTER — TRANSCRIPTION ENCOUNTER (OUTPATIENT)
Age: 49
End: 2024-05-29

## 2024-05-29 RX ORDER — INSULIN DEGLUDEC INJECTION 100 U/ML
100 INJECTION, SOLUTION SUBCUTANEOUS
Qty: 3 | Refills: 1 | Status: ACTIVE | COMMUNITY
Start: 2020-02-03 | End: 1900-01-01

## 2024-05-29 RX ORDER — ROSUVASTATIN CALCIUM 10 MG/1
10 TABLET, FILM COATED ORAL
Qty: 90 | Refills: 1 | Status: ACTIVE | COMMUNITY
Start: 2019-03-29 | End: 1900-01-01

## 2024-05-29 RX ORDER — PEN NEEDLE, DIABETIC 29 G X1/2"
32G X 4 MM NEEDLE, DISPOSABLE MISCELLANEOUS
Qty: 360 | Refills: 3 | Status: ACTIVE | COMMUNITY
Start: 2018-10-29 | End: 1900-01-01

## 2024-05-29 RX ORDER — LEVOTHYROXINE SODIUM 0.05 MG/1
50 TABLET ORAL
Qty: 90 | Refills: 1 | Status: ACTIVE | COMMUNITY
Start: 2019-11-20 | End: 1900-01-01

## 2024-05-31 ENCOUNTER — APPOINTMENT (OUTPATIENT)
Dept: PLASTIC SURGERY | Facility: AMBULATORY SURGERY CENTER | Age: 49
End: 2024-05-31
Payer: COMMERCIAL

## 2024-05-31 ENCOUNTER — RESULT REVIEW (OUTPATIENT)
Age: 49
End: 2024-05-31

## 2024-05-31 PROCEDURE — 19380 REVJ RECONSTRUCTED BREAST: CPT | Mod: 50

## 2024-06-06 ENCOUNTER — APPOINTMENT (OUTPATIENT)
Dept: PLASTIC SURGERY | Facility: CLINIC | Age: 49
End: 2024-06-06
Payer: COMMERCIAL

## 2024-06-06 VITALS
SYSTOLIC BLOOD PRESSURE: 108 MMHG | DIASTOLIC BLOOD PRESSURE: 69 MMHG | HEART RATE: 90 BPM | TEMPERATURE: 97.9 F | OXYGEN SATURATION: 99 %

## 2024-06-06 PROCEDURE — 99024 POSTOP FOLLOW-UP VISIT: CPT

## 2024-06-06 NOTE — REASON FOR VISIT
[Follow-Up: _____] : a [unfilled] follow-up visit [FreeTextEntry1] : s/p bilateral revision of reconstructed breasts with liposuction 5/31/24

## 2024-06-06 NOTE — HISTORY OF PRESENT ILLNESS
[FreeTextEntry1] : Ms. FERNANDO FLOYD is a 49 year old female with past medical history of breast cancer which was treated with implant reconstruction who presents now s/p b/l breast implant removal, capsulectomy, flat closure 2/28/24.  Doing well, presents today via telehealth to discuss revision options as she feels like the center part of her surgical area still has some excess skin making it difficult to wear clothes, she also has excess tissue under her right arm pit area making it hard to put her arms down  Denies fever, chills, LE pain/edema Doing well s/p bilateral revision of reconstructed breasts with trunk liposuction 5/31 Denies any issues or discomfort

## 2024-06-06 NOTE — PHYSICAL EXAM
[NI] : Normal [de-identified] : b/l chest wall flat incisions c/d/i no palpable fluid collections or signs of infection

## 2024-06-06 NOTE — ASSESSMENT
[FreeTextEntry1] : 50 yo female s/p b/l breast implant removal, and flat closure 2/28/24  doing well Discussed restrictions Keep compression garment on to help with healing monitor for redness, swelling, fever, chills no heavy lifting or strenuous activity continue to wear soft, non wire bra she is encouraged to call if there are any changes RTO 2 weeks all pt questions answered

## 2024-06-10 NOTE — REASON FOR VISIT
[Follow-Up: _____] : a [unfilled] follow-up visit [FreeTextEntry1] : s/p b/l breast reconstruction 2/28/24 follow-up visit.

## 2024-06-10 NOTE — PHYSICAL EXAM
[NI] : Normal [de-identified] : b/l chest wall flat incisions well healed no palpable fluid collections or signs of infection there is excess skin and tissue medially causing bunching in the middle of pts chest there is excess skin and tissue laterally of right chest wall chest wall otherwise flat

## 2024-06-10 NOTE — HISTORY OF PRESENT ILLNESS
[FreeTextEntry1] : Ms. FERNANDO FLOYD is a 49 year old female with past medical history of breast cancer which was treated with implant reconstruction who presents now s/p b/l breast implant removal, capsulectomy, flat closure 2/28/24.  Doing well, presents today via telehealth to discuss revision options as she feels like the center part of her surgical area still has some excess skin making it difficult to wear clothes, she also has excess tissue under her right arm pit area making it hard to put her arms down  Denies fever, chills, LE pain/edema

## 2024-06-18 ENCOUNTER — APPOINTMENT (OUTPATIENT)
Dept: PLASTIC SURGERY | Facility: CLINIC | Age: 49
End: 2024-06-18
Payer: COMMERCIAL

## 2024-06-18 VITALS
TEMPERATURE: 98 F | OXYGEN SATURATION: 100 % | SYSTOLIC BLOOD PRESSURE: 114 MMHG | WEIGHT: 158 LBS | HEIGHT: 64 IN | DIASTOLIC BLOOD PRESSURE: 71 MMHG | BODY MASS INDEX: 26.98 KG/M2 | HEART RATE: 77 BPM

## 2024-06-18 DIAGNOSIS — Z98.890 OTHER SPECIFIED POSTPROCEDURAL STATES: ICD-10-CM

## 2024-06-18 PROCEDURE — 99024 POSTOP FOLLOW-UP VISIT: CPT

## 2024-06-18 NOTE — PHYSICAL EXAM
[NI] : Normal [de-identified] : b/l chest wall flat incisions c/d/i no palpable fluid collections or signs of infection

## 2024-06-18 NOTE — ASSESSMENT
[FreeTextEntry1] : 48 yo female s/p b/l breast implant removal, and flat closure 2/28/24  doing well Discussed restrictions surgical tape and sutures removed today without difficulties Keep compression garment on to help with healing monitor for redness, swelling, fever, chills no heavy lifting or strenuous activity she is encouraged to call if there are any changes RTO 3 weeks all pt questions answered. [Enlarged Tonsils] : enlarged tonsils  [NL] : warm [FreeTextEntry3] : right outer canal filled with debries.very tender tragus.cannot see anything [FreeTextEntry4] : little congestion [de-identified] : little red

## 2024-06-18 NOTE — REASON FOR VISIT
[Follow-Up: _____] : a [unfilled] follow-up visit [FreeTextEntry1] :  s/p bilateral revision of reconstructed breasts with liposuction 5/31/24.

## 2024-06-18 NOTE — HISTORY OF PRESENT ILLNESS
[FreeTextEntry1] : Ms. FERNANDO FLOYD is a 49 year old female with past medical history of breast cancer which was treated with implant reconstruction who presents now s/p b/l breast implant removal, capsulectomy, flat closure 2/28/24.  Doing well, presents today via telehealth to discuss revision options as she feels like the center part of her surgical area still has some excess skin making it difficult to wear clothes, she also has excess tissue under her right arm pit area making it hard to put her arms down  Denies fever, chills, LE pain/edema Doing well s/p bilateral revision of reconstructed breasts with trunk liposuction 5/31 Denies any issues or discomfort. here today for suture removal

## 2024-07-09 ENCOUNTER — APPOINTMENT (OUTPATIENT)
Dept: PLASTIC SURGERY | Facility: CLINIC | Age: 49
End: 2024-07-09
Payer: COMMERCIAL

## 2024-07-09 VITALS
TEMPERATURE: 98.3 F | OXYGEN SATURATION: 92 % | DIASTOLIC BLOOD PRESSURE: 77 MMHG | HEART RATE: 100 BPM | SYSTOLIC BLOOD PRESSURE: 129 MMHG

## 2024-07-09 DIAGNOSIS — Z98.890 OTHER SPECIFIED POSTPROCEDURAL STATES: ICD-10-CM

## 2024-07-09 PROCEDURE — 99024 POSTOP FOLLOW-UP VISIT: CPT

## 2024-08-06 ENCOUNTER — TRANSCRIPTION ENCOUNTER (OUTPATIENT)
Age: 49
End: 2024-08-06

## 2024-08-06 ENCOUNTER — APPOINTMENT (OUTPATIENT)
Dept: ENDOCRINOLOGY | Facility: CLINIC | Age: 49
End: 2024-08-06

## 2024-08-06 PROCEDURE — 99215 OFFICE O/P EST HI 40 MIN: CPT

## 2024-08-06 PROCEDURE — 95251 CONT GLUC MNTR ANALYSIS I&R: CPT

## 2024-08-06 NOTE — ASSESSMENT
[FreeTextEntry1] : 49 year old female with T1DM, hypothyroidism, hypertension, hyperlipidemia.   Bilateral mastectomy for breast ac with breast implants.  She stopped omnipod due to local skin reactions and now she takes inpen.   1. T1DM: just  recovered from  B/L breast implants removal .  A1c 6.9.  cgm report revised and d/w pt: target Bgs      61% and high 21% and very high 14%          continue ICR lunch 1:6.5 and dinner 1:7 . Restart exercising.  continue the inpens .  decrease tresiba to 34 u HS  decrease ICR ratio at dinner by 0.5  to avoid hyperG after lunch  she would like to try glp1  wegovy for weight loss. Instructed about gastroparesis in type 1 might get worse  I have counseled the patient on the benefits, risks and side effects of this GLP-1 agonist. We discussed the benefits from cardiac standpoint and on glucose and weight reduction. We also discussed side effects with the patient including possible nausea, vomiting or other GI side effects. I have also discussed the risk of pancreatitis, including the symptoms to look out for, such as food intolerance, nausea/emesis and abdominal pain. The patient was also explained the link with medullary thyroid cancer and has denied any personal or family history of medullary thyroid cancer. Patient instructed to hold the medication 2 weeks prior to having surgery.   2. Hypothyroid / Hashimoto's : Continue current dose of LT4.  3. Hyperlipidemia: lipids very good. Cont statin.   4. HTN:  bp at target on meds. Continue current management.  5. Hypercalcemia- serum calcium normal. normal now.  Followed by oncologist (breast cancer)  check PTHi, 1,25diOHD, 25 OHD , PTHrp, SPEP  tfts normal.  24 hr urine calcium normal. Localized studies negative both  She would like to monitor for now.   6 . vitamin d deficiency : continue MVI   7 . Osteopenia: secondary to chemoT and estrogen Rc blockers.

## 2024-08-08 ENCOUNTER — TRANSCRIPTION ENCOUNTER (OUTPATIENT)
Age: 49
End: 2024-08-08

## 2024-08-13 ENCOUNTER — TRANSCRIPTION ENCOUNTER (OUTPATIENT)
Age: 49
End: 2024-08-13

## 2024-08-19 ENCOUNTER — TRANSCRIPTION ENCOUNTER (OUTPATIENT)
Age: 49
End: 2024-08-19

## 2024-08-19 DIAGNOSIS — E66.9 OBESITY, UNSPECIFIED: ICD-10-CM

## 2024-08-19 RX ORDER — SEMAGLUTIDE 0.5 MG/.5ML
0.5 INJECTION, SOLUTION SUBCUTANEOUS
Qty: 3 | Refills: 0 | Status: ACTIVE | COMMUNITY
Start: 2024-08-19 | End: 1900-01-01

## 2024-10-07 ENCOUNTER — TRANSCRIPTION ENCOUNTER (OUTPATIENT)
Age: 49
End: 2024-10-07

## 2024-10-21 ENCOUNTER — TRANSCRIPTION ENCOUNTER (OUTPATIENT)
Age: 49
End: 2024-10-21

## 2024-10-29 ENCOUNTER — APPOINTMENT (OUTPATIENT)
Dept: PLASTIC SURGERY | Facility: CLINIC | Age: 49
End: 2024-10-29

## 2024-10-29 VITALS — SYSTOLIC BLOOD PRESSURE: 103 MMHG | OXYGEN SATURATION: 100 % | HEART RATE: 100 BPM | DIASTOLIC BLOOD PRESSURE: 60 MMHG

## 2024-10-29 DIAGNOSIS — Z98.890 OTHER SPECIFIED POSTPROCEDURAL STATES: ICD-10-CM

## 2024-10-29 PROCEDURE — 14001 TIS TRNFR TRUNK 10.1-30SQCM: CPT

## 2024-11-06 ENCOUNTER — APPOINTMENT (OUTPATIENT)
Dept: ENDOCRINOLOGY | Facility: CLINIC | Age: 49
End: 2024-11-06
Payer: COMMERCIAL

## 2024-11-06 VITALS
BODY MASS INDEX: 24.59 KG/M2 | RESPIRATION RATE: 16 BRPM | DIASTOLIC BLOOD PRESSURE: 60 MMHG | HEIGHT: 64 IN | HEART RATE: 96 BPM | SYSTOLIC BLOOD PRESSURE: 108 MMHG | WEIGHT: 144 LBS | OXYGEN SATURATION: 99 %

## 2024-11-06 DIAGNOSIS — E10.65 TYPE 1 DIABETES MELLITUS WITH HYPERGLYCEMIA: ICD-10-CM

## 2024-11-06 LAB
HBA1C MFR BLD HPLC: 6.8
LDLC SERPL DIRECT ASSAY-MCNC: 72
MICROALBUMIN/CREAT 24H UR-RTO: 3

## 2024-11-06 PROCEDURE — 95251 CONT GLUC MNTR ANALYSIS I&R: CPT

## 2024-11-06 PROCEDURE — 99215 OFFICE O/P EST HI 40 MIN: CPT

## 2024-11-19 ENCOUNTER — APPOINTMENT (OUTPATIENT)
Dept: PLASTIC SURGERY | Facility: CLINIC | Age: 49
End: 2024-11-19
Payer: COMMERCIAL

## 2024-11-19 VITALS
SYSTOLIC BLOOD PRESSURE: 101 MMHG | DIASTOLIC BLOOD PRESSURE: 65 MMHG | OXYGEN SATURATION: 100 % | HEART RATE: 96 BPM | TEMPERATURE: 98.2 F

## 2024-11-19 DIAGNOSIS — Z98.890 OTHER SPECIFIED POSTPROCEDURAL STATES: ICD-10-CM

## 2024-11-19 PROCEDURE — 99212 OFFICE O/P EST SF 10 MIN: CPT

## 2024-11-26 ENCOUNTER — TRANSCRIPTION ENCOUNTER (OUTPATIENT)
Age: 49
End: 2024-11-26

## 2024-11-26 RX ORDER — SEMAGLUTIDE 1.7 MG/.75ML
1.7 INJECTION, SOLUTION SUBCUTANEOUS
Qty: 3 | Refills: 0 | Status: ACTIVE | COMMUNITY
Start: 2024-11-26 | End: 1900-01-01

## 2024-12-10 ENCOUNTER — APPOINTMENT (OUTPATIENT)
Dept: FAMILY MEDICINE | Facility: CLINIC | Age: 49
End: 2024-12-10
Payer: COMMERCIAL

## 2024-12-10 VITALS
TEMPERATURE: 97.8 F | RESPIRATION RATE: 15 BRPM | OXYGEN SATURATION: 98 % | DIASTOLIC BLOOD PRESSURE: 68 MMHG | SYSTOLIC BLOOD PRESSURE: 108 MMHG | BODY MASS INDEX: 23.9 KG/M2 | HEIGHT: 64 IN | WEIGHT: 140 LBS | HEART RATE: 100 BPM

## 2024-12-10 DIAGNOSIS — I10 ESSENTIAL (PRIMARY) HYPERTENSION: ICD-10-CM

## 2024-12-10 LAB — CYTOLOGY CVX/VAG DOC THIN PREP: NORMAL

## 2024-12-10 PROCEDURE — 99214 OFFICE O/P EST MOD 30 MIN: CPT

## 2024-12-10 PROCEDURE — G2211 COMPLEX E/M VISIT ADD ON: CPT | Mod: NC

## 2024-12-12 ENCOUNTER — TRANSCRIPTION ENCOUNTER (OUTPATIENT)
Age: 49
End: 2024-12-12

## 2024-12-16 ENCOUNTER — RX RENEWAL (OUTPATIENT)
Age: 49
End: 2024-12-16

## 2024-12-24 ENCOUNTER — TRANSCRIPTION ENCOUNTER (OUTPATIENT)
Age: 49
End: 2024-12-24

## 2025-01-02 ENCOUNTER — APPOINTMENT (OUTPATIENT)
Dept: FAMILY MEDICINE | Facility: CLINIC | Age: 50
End: 2025-01-02
Payer: COMMERCIAL

## 2025-01-02 VITALS
HEART RATE: 103 BPM | DIASTOLIC BLOOD PRESSURE: 70 MMHG | WEIGHT: 139.13 LBS | OXYGEN SATURATION: 97 % | BODY MASS INDEX: 23.75 KG/M2 | TEMPERATURE: 98.1 F | SYSTOLIC BLOOD PRESSURE: 110 MMHG | HEIGHT: 64 IN

## 2025-01-02 DIAGNOSIS — I10 ESSENTIAL (PRIMARY) HYPERTENSION: ICD-10-CM

## 2025-01-02 PROCEDURE — G2211 COMPLEX E/M VISIT ADD ON: CPT | Mod: NC

## 2025-01-02 PROCEDURE — 99214 OFFICE O/P EST MOD 30 MIN: CPT

## 2025-01-02 RX ORDER — VALSARTAN 80 MG/1
80 TABLET, COATED ORAL DAILY
Qty: 90 | Refills: 1 | Status: ACTIVE | COMMUNITY
Start: 2025-01-02 | End: 1900-01-01

## 2025-01-16 ENCOUNTER — TRANSCRIPTION ENCOUNTER (OUTPATIENT)
Age: 50
End: 2025-01-16

## 2025-01-23 ENCOUNTER — TRANSCRIPTION ENCOUNTER (OUTPATIENT)
Age: 50
End: 2025-01-23

## 2025-01-23 DIAGNOSIS — L65.9 NONSCARRING HAIR LOSS, UNSPECIFIED: ICD-10-CM

## 2025-01-28 ENCOUNTER — TRANSCRIPTION ENCOUNTER (OUTPATIENT)
Age: 50
End: 2025-01-28

## 2025-01-28 DIAGNOSIS — D64.9 ANEMIA, UNSPECIFIED: ICD-10-CM

## 2025-02-24 ENCOUNTER — TRANSCRIPTION ENCOUNTER (OUTPATIENT)
Age: 50
End: 2025-02-24

## 2025-02-25 ENCOUNTER — TRANSCRIPTION ENCOUNTER (OUTPATIENT)
Age: 50
End: 2025-02-25

## 2025-02-25 VITALS — WEIGHT: 142 LBS | BODY MASS INDEX: 24.37 KG/M2

## 2025-02-26 ENCOUNTER — NON-APPOINTMENT (OUTPATIENT)
Age: 50
End: 2025-02-26

## 2025-03-03 ENCOUNTER — NON-APPOINTMENT (OUTPATIENT)
Age: 50
End: 2025-03-03

## 2025-03-13 ENCOUNTER — TRANSCRIPTION ENCOUNTER (OUTPATIENT)
Age: 50
End: 2025-03-13

## 2025-03-24 ENCOUNTER — TRANSCRIPTION ENCOUNTER (OUTPATIENT)
Age: 50
End: 2025-03-24

## 2025-03-24 RX ORDER — INSULIN LISPRO 100 [IU]/ML
100 INJECTION, SOLUTION INTRAVENOUS; SUBCUTANEOUS
Qty: 2 | Refills: 3 | Status: ACTIVE | COMMUNITY
Start: 2025-03-24 | End: 1900-01-01

## 2025-05-22 ENCOUNTER — TRANSCRIPTION ENCOUNTER (OUTPATIENT)
Age: 50
End: 2025-05-22

## 2025-05-27 ENCOUNTER — TRANSCRIPTION ENCOUNTER (OUTPATIENT)
Age: 50
End: 2025-05-27

## 2025-05-28 ENCOUNTER — TRANSCRIPTION ENCOUNTER (OUTPATIENT)
Age: 50
End: 2025-05-28

## 2025-06-09 ENCOUNTER — APPOINTMENT (OUTPATIENT)
Dept: ENDOCRINOLOGY | Facility: CLINIC | Age: 50
End: 2025-06-09
Payer: COMMERCIAL

## 2025-06-09 VITALS
DIASTOLIC BLOOD PRESSURE: 66 MMHG | RESPIRATION RATE: 16 BRPM | BODY MASS INDEX: 24.59 KG/M2 | HEART RATE: 85 BPM | SYSTOLIC BLOOD PRESSURE: 124 MMHG | OXYGEN SATURATION: 96 % | HEIGHT: 64 IN | WEIGHT: 144 LBS

## 2025-06-09 PROBLEM — Z13.820 SCREENING FOR OSTEOPOROSIS: Status: ACTIVE | Noted: 2025-06-09

## 2025-06-09 PROCEDURE — 95251 CONT GLUC MNTR ANALYSIS I&R: CPT

## 2025-06-09 PROCEDURE — 99214 OFFICE O/P EST MOD 30 MIN: CPT

## 2025-06-09 PROCEDURE — G2211 COMPLEX E/M VISIT ADD ON: CPT | Mod: NC

## 2025-06-09 RX ORDER — VALSARTAN 80 MG/1
80 TABLET, COATED ORAL
Refills: 0 | Status: ACTIVE | COMMUNITY

## 2025-08-22 ENCOUNTER — RX RENEWAL (OUTPATIENT)
Age: 50
End: 2025-08-22